# Patient Record
Sex: FEMALE | Race: WHITE | NOT HISPANIC OR LATINO | Employment: FULL TIME | URBAN - METROPOLITAN AREA
[De-identification: names, ages, dates, MRNs, and addresses within clinical notes are randomized per-mention and may not be internally consistent; named-entity substitution may affect disease eponyms.]

---

## 2017-03-30 ENCOUNTER — TELEPHONE (OUTPATIENT)
Dept: TRANSPLANT | Facility: CLINIC | Age: 47
End: 2017-03-30

## 2017-03-30 NOTE — TELEPHONE ENCOUNTER
Vesta Dickerson called and stated that she is interested in becoming a living donor for her friend, Khushboo Nicolle.  Medical and social history obtained.  No contraindications noted.  All questions answered.  HLA kit requested.

## 2017-04-06 ENCOUNTER — TELEPHONE (OUTPATIENT)
Dept: TRANSPLANT | Facility: CLINIC | Age: 47
End: 2017-04-06

## 2017-04-06 DIAGNOSIS — Z00.5 TRANSPLANT DONOR EVALUATION: Primary | ICD-10-CM

## 2017-04-11 PROCEDURE — 81376 HLA II TYPING 1 LOCUS LR: CPT | Mod: 91,PO,TXP

## 2017-04-11 PROCEDURE — 81373 HLA I TYPING 1 LOCUS LR: CPT | Mod: 91,PO,TXP

## 2017-04-11 PROCEDURE — 81376 HLA II TYPING 1 LOCUS LR: CPT | Mod: PO,TXP

## 2017-04-11 PROCEDURE — 81373 HLA I TYPING 1 LOCUS LR: CPT | Mod: PO,TXP

## 2017-04-12 ENCOUNTER — LAB VISIT (OUTPATIENT)
Dept: LAB | Facility: HOSPITAL | Age: 47
End: 2017-04-12
Payer: MEDICARE

## 2017-04-12 DIAGNOSIS — Z00.5 TRANSPLANT DONOR EVALUATION: ICD-10-CM

## 2017-04-12 PROCEDURE — 86900 BLOOD TYPING SEROLOGIC ABO: CPT

## 2017-04-12 PROCEDURE — 86901 BLOOD TYPING SEROLOGIC RH(D): CPT

## 2017-04-12 PROCEDURE — 36415 COLL VENOUS BLD VENIPUNCTURE: CPT

## 2017-04-12 PROCEDURE — 86850 RBC ANTIBODY SCREEN: CPT

## 2017-04-13 LAB
ABO GROUP BLD: NORMAL
BLD GP AB SCN CELLS X3 SERPL QL: NORMAL
RH BLD: NORMAL

## 2017-04-17 ENCOUNTER — TELEPHONE (OUTPATIENT)
Dept: TRANSPLANT | Facility: CLINIC | Age: 47
End: 2017-04-17

## 2017-04-17 DIAGNOSIS — Z00.5 TRANSPLANT DONOR EVALUATION: Primary | ICD-10-CM

## 2017-04-17 NOTE — TELEPHONE ENCOUNTER
Patient notified that the results of the crossmatch with LIDIA Gray show that they are compatible. States she would like to proceed with the evaluation. Required testing discussed and information to schedule appointments provided.   Patient will have recent pap smear and mammogram faxed to us.  Education material emailed to patient.  All questions were answered and patient verbalized understanding.

## 2017-04-19 LAB — HLATY INTERPRETATION: NORMAL

## 2017-05-01 LAB
HLA DRB4 1: NORMAL
HLA SSO DNA TYPING CLASS I & II INTERPRETATION: NORMAL
HLA-A 1 SERO. EQUIV: 24
HLA-A 1: NORMAL
HLA-A 2 SERO. EQUIV: 68
HLA-A 2: NORMAL
HLA-B 1 SERO. EQUIV: 7
HLA-B 1: NORMAL
HLA-B 2 SERO. EQUIV: 35
HLA-B 2: NORMAL
HLA-BW 1 SERO. EQUIV: 6
HLA-BW 2 SERO. EQUIV: NORMAL
HLA-C 1: NORMAL
HLA-C 2: NORMAL
HLA-CW 1 SERO. EQUIV: 4
HLA-CW 2 SERO. EQUIV: 7
HLA-DQ 1 SERO. EQUIV: 7
HLA-DQ 2 SERO. EQUIV: 6
HLA-DQB1 1: NORMAL
HLA-DQB1 2: NORMAL
HLA-DRB1 1 SERO. EQUIV: 4
HLA-DRB1 1: NORMAL
HLA-DRB1 2 SERO. EQUIV: 15
HLA-DRB1 2: NORMAL
HLA-DRB3 1: NORMAL
HLA-DRB3 2: NORMAL
HLA-DRB345 1 SERO. EQUIV: 53
HLA-DRB345 2 SERO. EQUIV: 51
HLA-DRB4 2: NORMAL
HLA-DRB5 1: NORMAL
HLA-DRB5 2: NORMAL
SSDQB TESTING DATE: NORMAL
SSDRB TESTING DATE: NORMAL
SSOA TESTING DATE: NORMAL
SSOB TESTING DATE: NORMAL
SSOC TESTING DATE: NORMAL
SSODR TESTING DATE: NORMAL
TYSSO TESTING DATE: NORMAL

## 2017-05-09 ENCOUNTER — TELEPHONE (OUTPATIENT)
Dept: RADIOLOGY | Facility: HOSPITAL | Age: 47
End: 2017-05-09

## 2017-05-10 ENCOUNTER — HOSPITAL ENCOUNTER (OUTPATIENT)
Dept: RADIOLOGY | Facility: HOSPITAL | Age: 47
Discharge: HOME | End: 2017-05-10
Attending: NURSE PRACTITIONER
Payer: MEDICARE

## 2017-05-10 ENCOUNTER — HOSPITAL ENCOUNTER (OUTPATIENT)
Dept: CARDIOLOGY | Facility: CLINIC | Age: 47
Discharge: HOME | End: 2017-05-10
Payer: MEDICARE

## 2017-05-10 DIAGNOSIS — Z00.5 TRANSPLANT DONOR EVALUATION: ICD-10-CM

## 2017-05-10 LAB
DIASTOLIC DYSFUNCTION: NO
MITRAL VALVE MOBILITY: NORMAL
RETIRED EF AND QEF - SEE NOTES: 55 (ref 55–65)

## 2017-05-10 PROCEDURE — 78707 K FLOW/FUNCT IMAGE W/O DRUG: CPT | Mod: 26,TXP,, | Performed by: RADIOLOGY

## 2017-05-10 PROCEDURE — 93351 STRESS TTE COMPLETE: CPT | Mod: PBBFAC,TXP | Performed by: INTERNAL MEDICINE

## 2017-05-10 PROCEDURE — 93321 DOPPLER ECHO F-UP/LMTD STD: CPT | Mod: 26,S$PBB,TXP, | Performed by: INTERNAL MEDICINE

## 2017-05-10 PROCEDURE — 71020 XR CHEST PA AND LATERAL: CPT | Mod: 26,TXP,, | Performed by: RADIOLOGY

## 2017-05-11 ENCOUNTER — OFFICE VISIT (OUTPATIENT)
Dept: TRANSPLANT | Facility: CLINIC | Age: 47
End: 2017-05-11
Payer: MEDICARE

## 2017-05-11 ENCOUNTER — DOCUMENTATION ONLY (OUTPATIENT)
Dept: TRANSPLANT | Facility: CLINIC | Age: 47
End: 2017-05-11

## 2017-05-11 ENCOUNTER — HOSPITAL ENCOUNTER (OUTPATIENT)
Dept: RADIOLOGY | Facility: HOSPITAL | Age: 47
Discharge: HOME | End: 2017-05-11
Attending: NURSE PRACTITIONER
Payer: MEDICARE

## 2017-05-11 ENCOUNTER — OFFICE VISIT (OUTPATIENT)
Dept: PSYCHIATRY | Facility: CLINIC | Age: 47
End: 2017-05-11
Payer: MEDICARE

## 2017-05-11 VITALS
DIASTOLIC BLOOD PRESSURE: 64 MMHG | BODY MASS INDEX: 25.24 KG/M2 | TEMPERATURE: 98 F | HEART RATE: 78 BPM | SYSTOLIC BLOOD PRESSURE: 128 MMHG | OXYGEN SATURATION: 97 % | WEIGHT: 142.44 LBS | HEIGHT: 63 IN | RESPIRATION RATE: 16 BRPM

## 2017-05-11 DIAGNOSIS — Z00.5 TRANSPLANT DONOR EVALUATION: ICD-10-CM

## 2017-05-11 DIAGNOSIS — Z01.818 PRE-OP EXAM: Primary | ICD-10-CM

## 2017-05-11 DIAGNOSIS — Z52.4 KIDNEY DONOR: ICD-10-CM

## 2017-05-11 DIAGNOSIS — N20.0 KIDNEY STONE: ICD-10-CM

## 2017-05-11 DIAGNOSIS — F41.9 ANXIETY: ICD-10-CM

## 2017-05-11 DIAGNOSIS — Z00.5 TRANSPLANT DONOR EVALUATION: Primary | ICD-10-CM

## 2017-05-11 PROCEDURE — 99999 PR PBB SHADOW E&M-EST. PATIENT-LVL III: CPT | Mod: PBBFAC,TXP,,

## 2017-05-11 PROCEDURE — 96102 PR PSYCHOLOGIC TESTING BY TECHNICIAN: CPT | Mod: 59,S$PBB,TXP, | Performed by: PSYCHOLOGIST

## 2017-05-11 PROCEDURE — 90791 PSYCH DIAGNOSTIC EVALUATION: CPT | Mod: S$PBB,TXP,, | Performed by: PSYCHOLOGIST

## 2017-05-11 PROCEDURE — 74174 CTA ABD&PLVS W/CONTRAST: CPT | Mod: 26,TXP,, | Performed by: RADIOLOGY

## 2017-05-11 PROCEDURE — 90791 PSYCH DIAGNOSTIC EVALUATION: CPT | Mod: PBBFAC,TXP | Performed by: PSYCHOLOGIST

## 2017-05-11 PROCEDURE — 96101 PR PSYCHOLOGIC TESTING BY PSYCH/PHYS: CPT | Mod: 59,PBBFAC,TXP | Performed by: PSYCHOLOGIST

## 2017-05-11 PROCEDURE — 96102 PR PSYCHOLOGIC TESTING BY TECHNICIAN: CPT | Mod: PBBFAC,TXP | Performed by: PSYCHOLOGIST

## 2017-05-11 PROCEDURE — 99205 OFFICE O/P NEW HI 60 MIN: CPT | Mod: S$PBB,TXP,, | Performed by: INTERNAL MEDICINE

## 2017-05-11 PROCEDURE — 96101 PR PSYCHOLOGIC TESTING BY PSYCH/PHYS: CPT | Mod: S$PBB,TXP,, | Performed by: PSYCHOLOGIST

## 2017-05-11 PROCEDURE — 25500020 PHARM REV CODE 255: Mod: TXP | Performed by: NURSE PRACTITIONER

## 2017-05-11 PROCEDURE — 99204 OFFICE O/P NEW MOD 45 MIN: CPT | Mod: S$PBB,TXP,, | Performed by: TRANSPLANT SURGERY

## 2017-05-11 PROCEDURE — 74174 CTA ABD&PLVS W/CONTRAST: CPT | Mod: TC,TXP

## 2017-05-11 RX ORDER — MULTIVIT WITH MINERALS/HERBS
1 TABLET ORAL DAILY
COMMUNITY

## 2017-05-11 RX ORDER — ESCITALOPRAM OXALATE 10 MG/1
10 TABLET ORAL NIGHTLY
COMMUNITY

## 2017-05-11 RX ADMIN — IOHEXOL 125 ML: 350 INJECTION, SOLUTION INTRAVENOUS at 04:05

## 2017-05-11 NOTE — PROGRESS NOTES
PHARM.D. PRE-TRANSPLANT DONOR NOTE:    This patient's medication therapy was evaluated as part of her pre-transplant evaluation.    The following pharmacologic concerns were noted: patient does report use of advil PMS before meunstral cycle- but will discontinue and switch to tylenol as needed after donation      Current Outpatient Prescriptions   Medication Sig Dispense Refill    b complex vitamins tablet Take 1 tablet by mouth once daily.      escitalopram oxalate (LEXAPRO) 10 MG tablet Take 10 mg by mouth once daily.       No current facility-administered medications for this visit.

## 2017-05-11 NOTE — NURSING
Labs and hx reviewed.  No nutrition intervention required at this time.      Mile Ibrahim, MS RD LDN

## 2017-05-11 NOTE — PROGRESS NOTES
"   Kidney Transplant Donor Evaluation    Subjective:       CC:  Initial evaluation of kidney donor candidacy.    HPI:  Ms. Adams is a 47 y.o. year old White female who has presented to be evaluated as a potential living unrelated donor for her best friend.  Ms. Adams reports being here without coercion, payment, guilt or other alternative motives other than wanting to help someone with kidney disease.    She is here with her boyfriend/care giver    Feels great. No chest pain SOB. Does not smoke. Drinks alcohol very rarely. Works full time.     Very motivated with donation to her best friend    Great appetite very active. Normal BMI    Not interested in future pregnancies. She had a definitive procedure for sterilization    Denied any diabetes or hypertension associated with prior pregnancies. No hypertension, pyelonephritis, proteinuria, hematuria in the past. No h/o birth defects compromising the urinary tract    1 episode of kidney stone in 1999 "associated with pregnancies". Her current PTH is slightly increased       Patient denies any history of coronary artery disease, stroke, seizure disorder, chronic obstructive pulmonary disease, liver disease, kidney stones, gallstones, deep venous thrombosis, pulmonary embolism, recurrent urinary tract infections or malignancies.    Review of Systems     Skin: no skin rash  CNS; no headaches, blurred vision, seizure, or syncope  ENT: No JVD,  Adenopathies,  nasal congestion. No oral lesions  Cardiac: No chest pain, dyspnea, claudication, edema or palpitations  Respiratory: No SOB, cough, hemoptysis   Gastro-intestinal: No diarrhea, constipation, abdominal pain, nausea, vomit. No ascitis  Genitourinary: no hematuria, dysuria, frequency, frequency  Musculoskeletal: joint pain, arthritis or vasculitic changes  Psych: alert awake, oriented, No cranial nerves deficit.      Objective:   Physical Exam     Head: normocephalic  Neck: No JVD, cervical axillary, or femoral " adenopathies  Heart: no murmurs, Normal s1 and s2, No gallops, no rubs, No murmurs  Lungs; CTA, good respiratory effort, no crackles  Abdomen: soft, non tender, no splenomegaly or hepatomegaly, no massess, no bruits  Extremities: No edema, skin rash, joint pain  SNC: awake, alert oriented. Cranial nerves are intact, no focalized, sensitivity and strength preserved      Labs:  5/10/2017: Creatinine 0.7 mg/dL (Ref range: 0.5 - 1.4 mg/dL); Creatinine, Random Ur 134.0 mg/dL (Ref range: 15.0 - 325.0 mg/dL); Urine, Creatinine 29.0 mg/dL (Ref range: 15.0 - 325.0 mg/dL); BUN, Bld 14 mg/dL (Ref range: 6 - 20 mg/dL)  5/10/2017: Nitrite, UA Negative (Ref range: Negative)  ABO type: O    Assessment:     1. Kidney stone in 1999    2. Potential kidney donor    Plan:   Donor Candidacy:   Based on the given information, Ms. Adams appears to be a suitable candidate for kidney donation.  A final recommendation will be made by the selection committee after reviewing her complete workup.    We spoke for 45 minutes    We reviewed current data and explained with details results including PTH alkaline phosphatase, CBC BMP imaging studies...    Education was provided    We spoke about risk of CKD, life expectancy for donors, calculators about life risk of ESRD     We also discussed inability to provide precise risk of CKD post donation    We spoke about need for follow up with PCP, BP monitoring , healthy life style.            Дмитрий Singleton MD       Counseling:   I discussed with Ms. Adams that donation is voluntary and reiterated it should be done willingly and for altruistic reasons only.  I reviewed that no payment should be received for donating.  I also discussed that coercion, guilt, pressure, or feelings of obligation are not appropriate reasons to donate.  The option to withdraw at any time was emphasized.  Ms. Adams was reminded that a medical opt out can be given to protect her confidentiality, and no member of the  transplant team will discuss specifics of her health or medical/social history with anyone else without permission.  The need for lifelong routine medical follow-up for optimal health, including routine health maintenance was reviewed.    We also discussed the long term risks associated with kidney donation.  I told the patient that her GFR should return to within 75-80% of pre-donation level within six months of donation.  I informed the patient that there is a small risk of developing albuminuria and hypertension following donor nephrectomy.  I also informed the patient that based on current literature, the risk of developing end-stage renal disease following donor nephrectomy is similar to the general population.    I reviewed with MsAlia Adams available lab results and other diagnostics from the evaluation process    Additional Counseling:   The patient was counseled on the need for regular follow-up with a primary care physician for blood pressure and cholesterol screening.  The importance of age appropriate health screening was also emphasized.    Follow-up:   As per our center protocol  24 urine collection for stone risk profile  Get a PCP and follow up 1 to 2 times a year  Follow up with Gyn as per current guidelines    Altogether, 45 minutes of this encounter were spent on counseling, which was greater than 50% of the total visit time.

## 2017-05-11 NOTE — PROGRESS NOTES
Transplant Surgery Kidney Donor Evaluation     Referring Physician:     Subjective:     Chief Complaint: Vesta Adams is a 47 y.o. year old female who presents today wishing to be evaluated as a potential living unrelated donor for a friend.    History of Present Illness:  Vesta reports being here without coercion, payment, guilt, or other alternative motives other than wanting to help someone with kidney disease.    Review of Systems   Constitutional: Negative for chills and fever.   HENT: Negative for facial swelling and sore throat.    Eyes: Negative for redness and itching.   Respiratory: Negative for cough and shortness of breath.    Cardiovascular: Negative for chest pain and leg swelling.   Gastrointestinal: Negative for abdominal distention and abdominal pain.   Endocrine: Negative for polydipsia and polyphagia.   Genitourinary: Negative for dysuria and hematuria.   Musculoskeletal: Negative for back pain and myalgias.   Skin: Negative for pallor and rash.   Allergic/Immunologic: Negative for environmental allergies and immunocompromised state.   Neurological: Negative for light-headedness and headaches.   Hematological: Negative for adenopathy. Does not bruise/bleed easily.   Psychiatric/Behavioral: Negative for agitation and confusion.       Objective:   Physical Exam   Constitutional: She is oriented to person, place, and time. She appears well-developed and well-nourished. No distress.   Neck: Normal range of motion. Neck supple. No JVD present.   Cardiovascular: Normal rate and intact distal pulses.    Pulmonary/Chest: Effort normal. No respiratory distress.   Abdominal: Soft. She exhibits no distension and no mass. There is no tenderness. There is no rebound and no guarding.       Musculoskeletal: She exhibits no edema.   Neurological: She is alert and oriented to person, place, and time.   Skin: Skin is warm and dry. She is not diaphoretic.   Psychiatric: She has a normal mood and affect.      ABO type: O    Diagnoses:  1. Kidney stone in 1999             Transplant Surgery - Candidacy   Assessment/Plan:     Donor Candidacy: Based on information available thus far, Vesta is a suitable candidate for living kidney donation.    Vikash Segura MD       Education: I discussed with the patient the requirements for donation including the compatibility of blood and tissue typing, healthy by physical examination and workup, as well as the desire to donate.  We discussed the risks related to surgery including the risks related to anesthesia, bleeding, infection, inability for surgery to be performed laparoscopically, risks of reoperation as well as the risks of death.  We discussed the length of hospitalization, return to work times, as well as follow-up post-donation.    I also discussed the slight possibility that due to problems with the recipient operation, the transplant might not be able to be completed after the organ was already removed. If such a situation should arise, the donor prefers that the organ be transplanted into a suitable third-party recipient.    I discussed the possibility that living donor sometimes encounter problems obtaining health insurance, or could have higher premium despite ongoing efforts of transplant professionals to educate insurance companies on this issue.    I discussed with Vesta that donation is a voluntary activity, and reiterated it should be done willingly and for altruistic reasons only.  I reviewed that no payment should be made for donating.  I also discussed that coersion, guilt, pressure, or feelings of obligation are not appropriate reasons to donate.  The option to withdraw at any time was emphasized.  Vesta was reminded that a medical opt out can be given to protect her confidentiality, and no member of the transplant team will discuss specifics of her health or medical/social history with anyone else without permission.  The need for lifelong routine  medical follow-up for optimal health, including routine health maintenance was reviewed.    Additionally, I discussed the need for our program to be able to contact living donors for UNOS reporting purposes for a minimum of 2 years.  Failure of our center to be able to provide such information could jeopardize our ability to continue to offer living donor transplants.  Vesta voices understanding and agrees to this follow-up.    I discussed the UNOS requirement for centers to report donor status for a minimum of two years. Vesta understands that failure to comply with requirement could have adverse consequences for our transplant program, and agrees to cooperate with all our required follow-up.    I reviewed with Vesta available lab results and other diagnostics from the evaluation process.

## 2017-05-11 NOTE — PROGRESS NOTES
"DONOR TEACHING NOTE    Met with Vesta Adams today in clinic to review living donor education.        Topics covered included:  · Kidney donation is done voluntarily and of the donor's free will.  Process can stop at any time.   · Better success rates than cadaveric donation, shorter waiting time for the recipient: less than the 3-5 year wait on the list, more time to prepare: tests/surgery can be planned  · Laboratory studies of blood and urine.  Health exams: records of GYN/pap/mammogram (females); evaluation by transplant team and a psychiatrist (if indicated); diagnostic Tests: CXR, EKG, TB skin test, 24-hour urine collection, renal scan, CT of abdomen to assess kidney anatomy, and stress test (if indicated)  · Team will review workup for approval.  Copy of the approved criteria for donation given to patient.  Surgeon will decide which kidney will be donated.   · Benefits of laparoscopic nephrectomy: less pain, shorter hospital stay, a shorter recovery period.  Risks discussed: bleeding, deep vein thrombosis, pulmonary embolus, the need for re-operation.  Your operation may be converted to an "open" procedure if the surgeon feels it is medically necessary.  · To recovery room, transfer to TSU, if space allows or semi-private room.  Hart catheter/IV, average hospital stay is 1 day.  At discharge the cost of any prescriptions is the donor's responsibility.  · Post-operative visit 4 weeks after surgery or prior to returning to work, unless a complication arises and you need to be seen sooner.  Off of work for about 3 to 6 weeks, no driving for at least the first 3 weeks.  General surgical complications: infection, allergic reaction, and anesthesia.   · Long life considerations of living with one kidney: risk of HTN and kidney failure   · Following up with PCP 6 months after donation then yearly thereafter to monitor kidney function.  This should include weight, labs, urinalysis, and a blood pressure check.  " We are required to report your progress to UNOS at 6 months, 1 year, and 2 years post-donation.     Written educational material provided. Informed consent reviewed and signed. All questions were answered and patient verbalized understanding.     Patient is a suitable candidate for living kidney donation.

## 2017-05-15 ENCOUNTER — TELEPHONE (OUTPATIENT)
Dept: PSYCHIATRY | Facility: CLINIC | Age: 47
End: 2017-05-15
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
## 2017-05-15 NOTE — TELEPHONE ENCOUNTER
Report of Psychological Evaluation is completed. It can be accessed through the Chart Review activity under the Notes tab (11th tab to the right of the Encounters tab).  It is titled Psych Testing.      Mrs. Adams has a history of mild anxiety which has been treated on an outpatient basis with good results. She denied current anxiety/depression and appeared to be in good spirits in interview. Test data did not suggest significant anxiety, depression, substance abuse, or thought disorder. This evaluation revealed no contraindications to kidney donation from the psychological perspective. Her Ob/Gyn will continue to prescribe Lexapro until her new PCP is in place.    Thanks. EC
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
## 2017-05-19 ENCOUNTER — LAB VISIT (OUTPATIENT)
Dept: LAB | Facility: HOSPITAL | Age: 47
End: 2017-05-19
Attending: INTERNAL MEDICINE
Payer: COMMERCIAL

## 2017-05-19 DIAGNOSIS — Z00.5 TRANSPLANT DONOR EVALUATION: ICD-10-CM

## 2017-05-19 DIAGNOSIS — Z00.5 TRANSPLANT DONOR EVALUATION: Primary | ICD-10-CM

## 2017-05-19 PROCEDURE — 83945 ASSAY OF OXALATE: CPT | Mod: TXP

## 2017-05-19 PROCEDURE — 83735 ASSAY OF MAGNESIUM: CPT | Mod: TXP

## 2017-05-24 LAB — STONE ANALYSIS-IMP: NORMAL

## 2017-05-26 ENCOUNTER — COMMITTEE REVIEW (OUTPATIENT)
Dept: TRANSPLANT | Facility: CLINIC | Age: 47
End: 2017-05-26

## 2017-05-26 DIAGNOSIS — Z00.5 TRANSPLANT DONOR EVALUATION: Primary | ICD-10-CM

## 2017-05-26 NOTE — COMMITTEE REVIEW
Vesta Mooreerson was presented at selection committee on 5/26/17 . Patient did meet selection criteria for living kidney donation.  No absolute contraindications noted. Will repeat IPTH and obtain an endocrine consult to evaluate elevated IPTH.     Patient notified of committee decision. Will obtain consult asap, as she would like to proceed with surgery on 7/17/17. All questions were answered and patient verbalized understanding.     Note written by Traci Gomez RN.    ================================================================    I was present at the meeting and attest to the decision of the committee.

## 2017-06-02 ENCOUNTER — TELEPHONE (OUTPATIENT)
Dept: TRANSPLANT | Facility: CLINIC | Age: 47
End: 2017-06-02

## 2017-06-02 NOTE — TELEPHONE ENCOUNTER
Repeat lab testing reviewed with Dr. Rios. Repeat IPTH normal. Does not need endocrine consult. Patient notified.  Would like to proceed with surgery 7/17/17. Provided contact information for Edith Castillo RN. All questions were answered and patient verbalized understanding.

## 2017-06-07 ENCOUNTER — TELEPHONE (OUTPATIENT)
Dept: TRANSPLANT | Facility: CLINIC | Age: 47
End: 2017-06-07

## 2017-06-07 DIAGNOSIS — Z00.5 TRANSPLANT DONOR EVALUATION: Primary | ICD-10-CM

## 2017-06-07 NOTE — TELEPHONE ENCOUNTER
Confirmed surgery date of 7/10/17 and will pre-op on 7/6/17.  Denies any blood thinners or hormones.  Discussed possibility of cancellation due to a busy service.  Updated Mammo and PAP are scheduled for 6/14/17 and results will be faxed over when completed.  All questions answered and patient verbalized understanding.

## 2017-06-08 DIAGNOSIS — Z00.5 TRANSPLANT DONOR EVALUATION: Primary | ICD-10-CM

## 2017-06-15 ENCOUNTER — TELEPHONE (OUTPATIENT)
Dept: TRANSPLANT | Facility: CLINIC | Age: 47
End: 2017-06-15

## 2017-06-15 NOTE — TELEPHONE ENCOUNTER
----- Message from Jose Brown MD sent at 6/15/2017  1:34 PM CDT -----  Left   ----- Message -----  From: Traci Gomez  Sent: 5/22/2017  11:41 AM  To: Jose Brown MD    Please review donor CT scan. Left or right?

## 2017-07-06 ENCOUNTER — OFFICE VISIT (OUTPATIENT)
Dept: TRANSPLANT | Facility: CLINIC | Age: 47
End: 2017-07-06
Payer: MEDICARE

## 2017-07-06 ENCOUNTER — CLINICAL SUPPORT (OUTPATIENT)
Dept: TRANSPLANT | Facility: CLINIC | Age: 47
End: 2017-07-06
Payer: MEDICARE

## 2017-07-06 ENCOUNTER — SOCIAL WORK (OUTPATIENT)
Dept: TRANSPLANT | Facility: CLINIC | Age: 47
End: 2017-07-06
Payer: MEDICARE

## 2017-07-06 ENCOUNTER — HOSPITAL ENCOUNTER (OUTPATIENT)
Dept: PREADMISSION TESTING | Facility: HOSPITAL | Age: 47
Discharge: HOME | End: 2017-07-06
Attending: ANESTHESIOLOGY
Payer: MEDICARE

## 2017-07-06 ENCOUNTER — ANESTHESIA EVENT (OUTPATIENT)
Dept: SURGERY | Facility: HOSPITAL | Age: 47
DRG: 661 | End: 2017-07-06
Payer: MEDICARE

## 2017-07-06 VITALS
DIASTOLIC BLOOD PRESSURE: 64 MMHG | HEIGHT: 64 IN | WEIGHT: 138.44 LBS | OXYGEN SATURATION: 98 % | BODY MASS INDEX: 23.64 KG/M2 | SYSTOLIC BLOOD PRESSURE: 111 MMHG | TEMPERATURE: 98 F | HEART RATE: 80 BPM | HEART RATE: 80 BPM | OXYGEN SATURATION: 98 % | DIASTOLIC BLOOD PRESSURE: 64 MMHG | OXYGEN SATURATION: 98 % | TEMPERATURE: 98 F | HEART RATE: 80 BPM | OXYGEN SATURATION: 98 % | DIASTOLIC BLOOD PRESSURE: 64 MMHG | HEIGHT: 64 IN | WEIGHT: 138.44 LBS | HEIGHT: 64 IN | TEMPERATURE: 98 F | DIASTOLIC BLOOD PRESSURE: 64 MMHG | WEIGHT: 138.44 LBS | SYSTOLIC BLOOD PRESSURE: 111 MMHG | HEIGHT: 64 IN | BODY MASS INDEX: 23.64 KG/M2 | SYSTOLIC BLOOD PRESSURE: 111 MMHG | BODY MASS INDEX: 23.64 KG/M2 | WEIGHT: 138.44 LBS | TEMPERATURE: 98 F | SYSTOLIC BLOOD PRESSURE: 111 MMHG | HEART RATE: 80 BPM | BODY MASS INDEX: 23.64 KG/M2

## 2017-07-06 VITALS
DIASTOLIC BLOOD PRESSURE: 62 MMHG | TEMPERATURE: 99 F | OXYGEN SATURATION: 99 % | WEIGHT: 140.44 LBS | HEIGHT: 64 IN | SYSTOLIC BLOOD PRESSURE: 118 MMHG | BODY MASS INDEX: 23.98 KG/M2 | RESPIRATION RATE: 18 BRPM | HEART RATE: 82 BPM

## 2017-07-06 DIAGNOSIS — Z01.818 PREOP EXAMINATION: Primary | ICD-10-CM

## 2017-07-06 DIAGNOSIS — Z52.4 DONOR OF KIDNEY FOR TRANSPLANT: ICD-10-CM

## 2017-07-06 DIAGNOSIS — N20.0 KIDNEY STONE: ICD-10-CM

## 2017-07-06 PROCEDURE — 99213 OFFICE O/P EST LOW 20 MIN: CPT | Mod: PBBFAC,25,27

## 2017-07-06 PROCEDURE — 99215 OFFICE O/P EST HI 40 MIN: CPT | Mod: S$PBB,,, | Performed by: TRANSPLANT SURGERY

## 2017-07-06 PROCEDURE — 99999 PR PBB SHADOW E&M-EST. PATIENT-LVL III: CPT | Mod: PBBFAC,,,

## 2017-07-06 PROCEDURE — 99999 PR PBB SHADOW E&M-EST. PATIENT-LVL III: CPT | Mod: PBBFAC,TXP,, | Performed by: INTERNAL MEDICINE

## 2017-07-06 PROCEDURE — 99999 PR PBB SHADOW E&M-EST. PATIENT-LVL III: CPT | Mod: PBBFAC,TXP,,

## 2017-07-06 PROCEDURE — 99214 OFFICE O/P EST MOD 30 MIN: CPT | Mod: S$PBB,,, | Performed by: INTERNAL MEDICINE

## 2017-07-06 RX ORDER — HEPARIN SODIUM 5000 [USP'U]/ML
5000 INJECTION, SOLUTION INTRAVENOUS; SUBCUTANEOUS
Status: CANCELLED | OUTPATIENT
Start: 2017-07-06

## 2017-07-06 NOTE — PROGRESS NOTES
PHARM.D. PRE-TRANSPLANT DONOR NOTE:    This patient's medication therapy was evaluated as part of her pre-transplant evaluation.    The following pharmacologic concerns were noted: none      Current Outpatient Prescriptions   Medication Sig Dispense Refill    b complex vitamins tablet Take 1 tablet by mouth once daily.      escitalopram oxalate (LEXAPRO) 10 MG tablet Take 10 mg by mouth once daily.       No current facility-administered medications for this visit.          Currently she is responsible for preparing / administering this patient's medications on a daily basis.  I am available for consultation and can be contacted, as needed by the other members of the Kidney Transplant team.

## 2017-07-06 NOTE — PROGRESS NOTES
PRE-OP TEACHING NOTE    Vesta Adams is here today for pre-op appointments.  Pre-op instructions reviewed and written information was provided.  All questions were answered.  Discussed possibility that surgery may be rescheduled if the program is busy with  donor transplants.  Patient agreed and verbalized understanding of all instructions.

## 2017-07-06 NOTE — PROGRESS NOTES
DONOR PRE-OP NOTE    Potential Donor Name: Vesta Adams, [id number]  Encounter Date: 7/6/2017    Sex: female  YOB: 1970  Age: 47 y.o.    Housing/Contact Info:  [id number]    Telephone Information:   Mobile [phone]    Home: [phone] (home)  Work: There is no work phone number on file.  E-mail: [email or web address]    Potential Surgery Date: Monday  Potential Recipient Name: Lizet Gray, Clinic Number: [id number]  Potential Recipient Relationship: friend    Patient presents as alert and oriented x 4, pleasant, good eye contact, well groomed, recall good, concentration/judgement good, average intelligence, calm, communicative, cooperative and asking and answering questions appropriately. Patient presents as a 47 y.o. year old female to donor pre-op appointment for scheduled kidney living donor surgery. Patient's significant other accompanies patient.  Patient states that she is independent with ADLs at this time.  Patient states continued motivation to pursue organ donation at this time.    Does patient drive?  Patient is aware will not be able to drive until medically cleared by the transplant team. Patient verbalizes understanding that patient will need assistance for all transportation needs until medically cleared to drive.    Caregivers/Transportation:  Name: Raul Sainz  Age: 48  Phone: [phone]  Relationship: significant other  Does person drive? yes  Does person have own/reliable transportation? yes    Cognitive:  Education: college degree  Reading Level: college  Reports difficulty with: seeing and utilizes glasses  Denies difficulty with: reading, writing, hearing, comprehension, learning and memory    Infusion Service: patient utilizing? no  Home Health: patient utilizing? no  DME:  patient utilizing? no    Living Will: no  Healthcare Power of : no  Written LW/HCPA and verbal information presented to patient today.    Insurance: See  potential recipient's insurance for donor coverage.    Patient's Insurance: Does potential donor have their own health insurance? Yes  Possible concerns regarding insurance post-donation reviewed. Patient verbalizes clear understanding.    Financial:  Employment: Patient is currently employed. Patient does plan to return to work once medically cleared.   Spouse/Significant Other Employment: employed  Patient states does not expect to have any financial problems following transplant surgery.  Patient states has not conducted fundraising to assist with donation costs.    Tobacco/Alcohol/Illicit Drug Abuse: Patient reports does not use tobacco products, alcohol, illicit drugs and non-prescription medications and that patient does plan to remain abstinent.     Psychiatric History: yes, pt previously reports anxiety due to previous marraige and pending divorce. Pt's s/o reports anxiety with pending surgery. Pt's s/o reports pt is concerned about her children is she dies as a result of donation and if the surgery is rescheduled multiple time. Pt was not going to report this information to SW initially and seemed relieved after s/o addressed it. SW provided acknowledgement, validation, active listening, normalization and support. Pt reports feeling comforted and denies lack of motivation to donate. SW encouarged pt to continue processing her feelings regarding donation and to remain open with team. Pt and s/o verbalized understanding.  Pt also utilizes [employer].     Coping: Patient states that she is coping well at this time and states does not have anymore concerns, questions, or needs at this time. Patient states will call as needed and does understand how to access resources including the  as needed, both inpatient and outpatient.    Resources, information and support provided. Psychosocial aspects regarding organ donation and transplantation were discussed. Patient reports having a clear understanding of  resources, information, support and psychosocial aspects related to organ donation.    Discharge Plan: Patient to discharge to the [facility] under the care of patient's significant other post-organ transplant. Patient states that patient's significant other will be present as caregiver in the hospital. Patient's significant other will transport patient home. Patient states agreement with not driving and not returning to work until medically cleared to do so.    Patient continues to report having a clear understanding of confidentiality, option to not donate, alternative treatment options, importance of compliance, resources and resource limitations, insurance and insurance insurable limitations, educational information, and the risks involved. Patient understands that the transplant may or may not work, the transplant date/donation date may be cancelled or postponed, and the psychosocial factors involved before, during and after donation.    Patient states having clear understanding and realistic expectations regarding the potential risks and potential benefits of organ transplantation and organ donation. Patient agrees to further discuss with health care team members and support system members, as well as to utilize available resources and express questions and/or concerns. Resources and information provided and reviewed.    Patient denies feelings of coercion, pressure or obligation to donate. Patient states that she is not receiving compensation for organ donation.    Patient reports motivation to pursue organ donation at this time.    Suitability for Donation: At this time, patient presents as a a suitable candidate for organ donation. Patient states does have a caregiver plan, transportation plan, and lodging plan in place. Patient states that patient does have medical and prescription medicine insurance in place and does have a plan in place to afford post-transplant costs.    Patient provided verbal  permission to release any necessary information to outside resources for patient care and discharge planning.  Resources and information provided and reviewed.  Patient is choosing local pharmacy.    Pharmacy Name: [facility]   Pharmacy Contact Information: Pt is aware of the [facility] on [facility]      provided psychosocial support, counseling, resources, education, assistance, and discharge planning.  remains available.    Recommendations/Additional Comments: Pt does struggled with anxiety and utilizes Lexapro.     Patient states is aware of [facility]'s affiliation and/or partnership with agencies in home health care, LTAC, SNF, [facility], and other hospitals and clinics.

## 2017-07-06 NOTE — LETTER
July 6, 2017                     [address]  [address]  [facility] [address]  Phone: [phone]   Patient: Vesta Adams   MR Number: [id number]   YOB: 1970   Date of Visit: 7/6/2017       Dear      Thank you for referring Vesta Adams to me for evaluation. Attached you will find relevant portions of my assessment and plan of care.    If you have questions, please do not hesitate to call me. I look forward to following Vesta Adams along with you.    Sincerely,    Alexia Rios MD    Enclosure    If you would like to receive this communication electronically, please contact [email or web address] or [phone] to request [employer] access.    [employer] is a tool which provides read-only access to select patient information with whom you have a relationship. Its easy to use and provides real time access to review your patients record including encounter summaries, notes, results, and demographic information.    If you feel you have received this communication in error or would no longer like to receive these types of communications, please e-mail [email or web address]

## 2017-07-06 NOTE — PROGRESS NOTES
Kidney Donor Preoperative Evaluation    Subjective:     CC:  Preoperative evaluation before donor nephrectomy.    HPI:  Ms. Adams is a 47 y.o. year old White female who has been approved to be a living unrelated donor for her friend.  She denies any changes in her health condition since her previous visit.  Patient denies any history of DM, HTN,  recent coronary artery disease, stroke, seizure disorder, chronic obstructive pulmonary disease, liver disease, kidney stones, gallstones, deep venous thrombosis, pulmonary embolism, recurrent urinary tract infections or malignancies.Her repeated PTH was normal (per patient). All her work ups have been normal.     Review of Systems     Skin: no skin rash  CNS; no headaches, blurred vision, seizure, or syncope  ENT: No JVD,  Adenopathies,  nasal congestion. No oral lesions  Cardiac: No chest pain, dyspnea, claudication, edema or palpitations  Respiratory: No SOB, cough, hemoptysis   Gastro-intestinal: No diarrhea, constipation, abdominal pain, nausea, vomit. No ascitis  Genitourinary: no hematuria, dysuria, frequency, frequency  Musculoskeletal: joint pain, arthritis or vasculitic changes  Psych: alert awake, oriented, No cranial nerves deficit.    Objective:   body mass index is 23.76 kg/m².  Physical Exam     Constitutional: She is oriented to person, place, and time. She appears well-developed and well-nourished.   HENT:   Head: Normocephalic and atraumatic.   Eyes: EOM are normal. Pupils are equal, round, and reactive to light.   Cardiovascular: Normal rate and regular rhythm.    Pulmonary/Chest: Effort normal.   Abdominal: Soft. She exhibits no distension. There is no tenderness. There is no guarding.   Musculoskeletal: Normal range of motion.   Neurological: She is alert and oriented to person, place, and time.   Skin: Skin is warm and dry.   Psychiatric: She has a normal mood and affect. Her behavior is normal.      Labs:  5/10/2017: WBC, UA 1 /hpf (Ref range:  0 - 5 /hpf)  5/10/2017: Creatinine, Random Ur 134.0 mg/dL (Ref range: 15.0 - 325.0 mg/dL); Urine, Creatinine 29.0 mg/dL (Ref range: 15.0 - 325.0 mg/dL)  7/6/2017: Creatinine 0.7 mg/dL (Ref range: 0.5 - 1.4 mg/dL); BUN, Bld 12 mg/dL (Ref range: 6 - 20 mg/dL)  5/10/2017: Nitrite, UA Negative (Ref range: Negative)    Labs were reviewed with the patient.    ABO type: O POS    Assessment:     1. Preop examination    2. Donor of kidney for transplant    3. Kidney stone in 1999        Plan:   Donor Candidacy:   Ms. Adams remains an excellent candidate for kidney donation.    Alexia Rios MD         Counseling:   I discussed with Ms. Adams that donation is a voluntary activity and reiterated it should be done willingly and for altruistic reasons only.  I reviewed that no payment should be received for donating.  I also discussed that coercion, guilt, pressure, or feelings of obligation are not appropriate reasons to donate.  The option to withdraw at any time was emphasized.  Ms. Adams was reminded that a medical opt out can be given to protect her confidentiality, and no member of the transplant team will discuss specifics of her health or medical/social history with anyone else without permission.  The need for lifelong routine medical follow-up for optimal health, including routine health maintenance was reviewed.    We also discussed the long term risks associated with kidney donation.  I told the patient that her GFR should return to within 75-80% of pre-donation level within six months of donation.  I informed the patient that there is a small risk of developing albuminuria and hypertension following donor nephrectomy.  I also informed the patient that based on current literature, the risk of developing end-stage renal disease following donor nephrectomy is similar to the general population.    I rereviewed with Ms. Adams available lab results and other diagnostics from the evaluation  process    Additional Counseling:   The patient was counseled on the need for regular follow-up with a primary care physician for blood pressure and cholesterol screening.  The importance of age appropriate health screening was also emphasized.    Follow-up:  Follow-up with transplant surgery per protocol.

## 2017-07-06 NOTE — ANESTHESIA PREPROCEDURE EVALUATION
07/06/2017  Vesta Adams is a 47 y.o., female.    Anesthesia Evaluation         Review of Systems  Anesthesia Hx:  History of prior surgery of interest to airway management or planning: Previous anesthesia: General lap appendectomy 12 yrs ago; uterine ablation 10 yrs ago with general anesthesia. Denies Family Hx of Anesthesia complications.  Personal Hx of Anesthesia complications, Post-Operative Nausea/Vomiting, with every anesthetic, treatment not known   Social:  Former Smoker, Social Alcohol Use Quit 2 yrs ago; 1 ppd x 25 yrs   Hematology/Oncology:     Oncology Normal    -- Denies Anemia:   EENT/Dental:   Glasses  TMJ   Cardiovascular:   Denies Hypertension.   Functional Capacity good / => 4 METS, walking for 45 min 3-4 x weekly; denies CP, SOB    Pulmonary:   Denies Asthma.  Denies Sleep Apnea.    Renal/:   renal calculi (1999)    Hepatic/GI:   GERD (takes prn Zantac; diet changes; lost 13 lbs; seldom has symptoms)    Musculoskeletal:  Musculoskeletal Normal    Neurological:   Denies CVA. Denies Seizures.  Denies Pain    Endocrine:   Denies Diabetes.    Psych:  Phobia and Claustrophobia.         Physical Exam  General:  Well nourished    Airway/Jaw/Neck:  Airway Findings: Mouth Opening: Normal Tongue: Normal  General Airway Assessment: Adult  Mallampati: II  Jaw/Neck Findings:     Neck ROM: Normal ROM      Dental:  Dental Findings: In tact   Chest/Lungs:  Chest/Lungs Findings: Clear to auscultation, Normal Respiratory Rate     Heart/Vascular:  Heart Findings: Rate: Normal  Rhythm: Regular Rhythm  Sounds: Normal        Mental Status:  Mental Status Findings:  Cooperative, Alert and Oriented       Pt was seen in POC 7/6/17/Evelin Ambriz RN        Anesthesia Plan  Type of Anesthesia, risks & benefits discussed:  Anesthesia Type:  general  Patient's Preference: General  Intra-op Monitoring  Plan: standard ASA monitors  Intra-op Monitoring Plan Comments:   Post Op Pain Control Plan: per primary service following discharge from PACU  Post Op Pain Control Plan Comments: Per primary service  Induction:   IV  Beta Blocker:  Patient is not currently on a Beta-Blocker (No further documentation required).       Informed Consent: Patient understands risks and agrees with Anesthesia plan.  Questions answered. Anesthesia consent signed with patient.  ASA Score: 2     Day of Surgery Review of History & Physical:    H&P update referred to the surgeon.     Anesthesia Plan Notes: Scop. Patch, decadron, zofran, phenergan (prn)        Ready For Surgery From Anesthesia Perspective.

## 2017-07-06 NOTE — DISCHARGE INSTRUCTIONS
Your surgery has been scheduled for:__________________________________________    You should report to:  ____[facility], located on the [place] side of the first floor of the           [facility] ([phone])  ____[facility], located on the [facility] side of the            Second floor of the [facility] ([phone])  ____3rd Floor [facility] located on the [facility] side of the [facility] [phone]  Please Note   - Tell your doctor if you take Aspirin, products containing Aspirin, herbal medications  or blood thinners, such as Coumadin, Ticlid, or Plavix.  (Consult your provider regarding holding or stopping before surgery).  - Arrange for someone to drive you home following surgery.  You will not be allowed to leave the surgical facility alone or drive yourself home following sedation and anesthesia.  Before Surgery  - Stop taking all herbal medications 14days prior to surgery  - No Motrin/Advil (Ibuprofen) 7 days before surgery  - No Aleve (Naproxen) 7 days before surgery  - Stop Taking Asprin, products containing Asprin _____days before surgery  - Stop taking blood thinners_______days before surgery  - Refrain from drinking alcoholic beverages for 24hours before and after surgery  - Stop or limit smoking _________days before surgery  Night before Surgery  - DO NOT EAT OR DRINK ANYTHING AFTER MIDNIGHT, INCLUDING GUM, HARD CANDY, MINTS, OR CHEWING TOBACCO.  - Take a shower or bath (shower is recommended).  Bathe with Hibiclens soap or an antibacterial soap from the neck down.  If not supplied by your surgeon, hibiclens soap will need to be purchased over the counter in pharmacy.  Rinse soap off thoroughly.  - Shampoo your hair with your regular shampoo  The Day of Surgery  - Take another bath or shower with hibiclens or any antibacterial soap, to reduce the chance of infection.  - Take heart and blood  pressure medications with a small sip of water, as advised by the perioperative team.  - Do not take fluid pills  - You may brush your teeth and rinse your mouth, but do not swall any additional water.   - Do not apply perfumes, powder, body lotions or deodorant on the day of surgery.  - Nail polish should be removed.  - Do not wear makeup or moisturizer  - Wear comfortable clothes, such as a button front shirt and loose fitting pants.  - Leave all jewelry, including body piercings, and valuables at home.    - Bring any devices you will neeed after surgery such as crutches or canes.  - If you have sleep apnea, please bring your CPAP machine  In the event that your physical condition changes including the onset of a cold or respiratory illness, or if you have to delay or cancel your surgery, please notify your surgeon.Anesthesia: General Anesthesia  Youre due to have surgery. During surgery, youll be given medication called anesthesia. (It is also called anesthetic.) This will keep you comfortable and pain-free. Your anesthesia provider will use general anesthesia. This sheet tells you more about it.  What is general anesthesia?     You are watched continuously during your procedure by the anesthesia provider   General anesthesia puts you into a state like deep sleep. It goes into the bloodstream (IV anesthetics), into the lungs (gas anesthetics), or both. You feel nothing during the procedure. You will not remember it. During the procedure, the anesthesia provider monitors you continuously. He or she checks your heart rate and rhythm, blood pressure, breathing, and blood oxygen.  · IV Anesthetics. IV anesthetics are given through an IV line in your arm. Theyre often given first. This is so you are asleep before a gas anesthetic is started. Some kinds of IV anesthetics relieve pain. Others relax you. Your doctor will decide which kind is best in your case.  · Gas Anesthetics. Gas anesthetics are breathed into the  lungs. They are often used to keep you asleep. They can be given through a facemask or a tube placed in your larynx or trachea (breathing tube).  ? If you have a facemask, your anesthesia provider will most likely place it over your nose and mouth while youre still awake. Youll breathe oxygen through the mask as your IV anesthetic is started. Gas anesthetic may be added through the mask.  ? If you have a tube in the larynx or trachea, it will be inserted into your throat after youre asleep.  Anesthesia tools and medications  You will likely have:  · IV anesthetics. These are put into an IV line into your bloodstream.  · Gas anesthetics. You breathe these anesthetics into your lungs, where they pass into your bloodstream.  · Pulse oximeter. This is a small clip that is attached to the end of your finger. This measures your blood oxygen level.  · Electrocardiography leads (electrodes). These are small sticky pads that are placed on your chest. They record your heart rate and rhythm.  · Blood pressure cuff. This reads your blood pressure.  Risks and possible complications  General anesthesia has some risks. These include:  · Breathing problems  · Nausea and vomiting  · Sore throat or hoarseness (usually temporary)  · Allergic reaction to the anesthetic  · Irregular heartbeat (rare)  · Cardiac arrest (rare)   Anesthesia safety  · Follow all instructions you are given for how long not to eat or drink before your procedure.  · Be sure your doctor knows what medications and drugs you take. This includes over-the-counter medications, herbs, supplements, alcohol or other drugs. You will be asked when those were last taken.  · Have an adult family member or friend drive you home after the procedure.  · For the first 24 hours after your surgery:  ? Do not drive or use heavy equipment.  ? Have a trusted family member or spouse make important decisions or sign documents.  ? Avoid alcohol.  ? Have a responsible adult stay with  you. He or she can watch for problems and help keep you safe.  Date Last Reviewed: 10/16/2014  © 4133-2588 [employer]. [address] [place] [address], [place] [address]. All rights reserved. This information is not intended as a substitute for professional medical care. Always follow your healthcare professional's instructions.

## 2017-07-06 NOTE — PROGRESS NOTES
"   Transplant Surgery Kidney Donor Preoperative Evaluation / History and PHysical    Subjective:   CC:  Preoperative evaluation before donor nephrectomy.    HPI:  Ms. Adams is a 47 y.o. year old White female who has been approved to be a living unrelated donor for her friend Isauro Valverde.  She denies any changes in her health condition since her previous visit.      Review of Systems   Constitutional: Negative for activity change, appetite change, chills and fever.   HENT: Negative for congestion, nosebleeds, sinus pressure, sore throat and voice change.    Eyes: Negative for pain and visual disturbance.   Respiratory: Negative for cough and shortness of breath.    Cardiovascular: Negative for palpitations and leg swelling.   Gastrointestinal: Negative for abdominal distention, abdominal pain, diarrhea, nausea and vomiting.   Endocrine: Negative for cold intolerance and heat intolerance.   Genitourinary: Negative for dysuria, flank pain, frequency and hematuria.   Musculoskeletal: Negative for back pain and gait problem.   Skin: Negative for color change, pallor and wound.   Allergic/Immunologic: Negative for food allergies.   Neurological: Negative for dizziness, seizures, numbness and headaches.   Hematological: Negative for adenopathy.   Psychiatric/Behavioral: Negative for agitation, behavioral problems, hallucinations and sleep disturbance.       Objective:   Blood pressure 111/64, pulse 80, temperature 98.2 °F (36.8 °C), temperature source Oral, height 5' 4" (1.626 m), weight 62.8 kg (138 lb 7.2 oz), SpO2 98 %.  Physical Exam   Constitutional: She is oriented to person, place, and time. She appears well-developed and well-nourished.   HENT:   Head: Normocephalic and atraumatic.   Eyes: EOM are normal. Pupils are equal, round, and reactive to light.   Cardiovascular: Normal rate and regular rhythm.    Pulmonary/Chest: Effort normal.   Abdominal: Soft. She exhibits no distension. There is no " tenderness. There is no guarding.   Thin body habitus;  Small faint lap scars   Musculoskeletal: Normal range of motion.   Neurological: She is alert and oriented to person, place, and time.   Skin: Skin is warm and dry.   Psychiatric: She has a normal mood and affect. Her behavior is normal.       ABO type: O POS    Imaging Studies:  Nuclear split function renal scan:   Left:  53%   Right: 47%  CT angiogram:   Left renal arteries: 1   Right renal arteries: 1   Other important findings: n/a    Assessment:     1. Donor of kidney for transplant        Plan:   Transplant Surgery Donor Candidacy:   Ms. Adams remains an excellent candidate for kidney donation.    Based on the preoperative evaluation, a left robotic donor nephrectomy is planned.  Bertrand Diaz Jr, MD       Counseling:   I discussed with Ms. Adams that donation is a voluntary activity and reiterated it should be done willingly and for altruistic reasons only.  I reviewed that no payment should be received for donating.  I also discussed that coercion, guilt, pressure, or feelings of obligation are not appropriate reasons to donate.  The option to withdraw at any time was emphasized.  Ms. Adams was reminded that a medical opt out can be given to protect her confidentiality, and no member of the transplant team will discuss specifics of her health or medical/social history with anyone else without permission.  The need for lifelong routine medical follow-up for optimal health, including routine health maintenance was reviewed.    I discussed the risks related to surgery including the risks related to anesthesia, bleeding, infection, inability for surgery to be performed laparoscopically, risks of reoperation as well as the risks of death.  We discussed the length of hospitalization, return to work times, as well as follow-up post-donation.    I also discussed the slight possibility that due to problems with the recipient operation, the  transplant might not be able to be completed after the organ was already removed. If such a situation should arise, the donor prefers that the organ be transplanted into a suitable third-party recipient.

## 2017-07-10 ENCOUNTER — HOSPITAL ENCOUNTER (INPATIENT)
Facility: HOSPITAL | Age: 47
LOS: 1 days | Discharge: HOME | DRG: 661 | End: 2017-07-11
Attending: TRANSPLANT SURGERY | Admitting: TRANSPLANT SURGERY
Payer: MEDICARE

## 2017-07-10 ENCOUNTER — ANESTHESIA (OUTPATIENT)
Dept: SURGERY | Facility: HOSPITAL | Age: 47
DRG: 661 | End: 2017-07-10
Payer: MEDICARE

## 2017-07-10 DIAGNOSIS — Z00.5 TRANSPLANT DONOR EVALUATION: ICD-10-CM

## 2017-07-10 DIAGNOSIS — Z52.4 DONOR OF KIDNEY FOR TRANSPLANT: Primary | ICD-10-CM

## 2017-07-10 LAB
B-HCG UR QL: NEGATIVE
CTP QC/QA: YES
HCT VFR BLD AUTO: 37.5 %

## 2017-07-10 PROCEDURE — 25000003 PHARM REV CODE 250: Performed by: TRANSPLANT SURGERY

## 2017-07-10 PROCEDURE — 50547 LAPARO REMOVAL DONOR KIDNEY: CPT | Mod: 82,,, | Performed by: TRANSPLANT SURGERY

## 2017-07-10 PROCEDURE — 37000009 HC ANESTHESIA EA ADD 15 MINS: Performed by: TRANSPLANT SURGERY

## 2017-07-10 PROCEDURE — 71000033 HC RECOVERY, INTIAL HOUR: Performed by: TRANSPLANT SURGERY

## 2017-07-10 PROCEDURE — D9220A PRA ANESTHESIA: Mod: CRNA,,, | Performed by: NURSE ANESTHETIST, CERTIFIED REGISTERED

## 2017-07-10 PROCEDURE — 99499 UNLISTED E&M SERVICE: CPT | Mod: ,,, | Performed by: TRANSPLANT SURGERY

## 2017-07-10 PROCEDURE — 63600175 PHARM REV CODE 636 W HCPCS: Performed by: NURSE ANESTHETIST, CERTIFIED REGISTERED

## 2017-07-10 PROCEDURE — D9220A PRA ANESTHESIA: Mod: ANES,,, | Performed by: ANESTHESIOLOGY

## 2017-07-10 PROCEDURE — 71000039 HC RECOVERY, EACH ADD'L HOUR: Performed by: TRANSPLANT SURGERY

## 2017-07-10 PROCEDURE — 36000712 HC OR TIME LEV V 1ST 15 MIN: Performed by: TRANSPLANT SURGERY

## 2017-07-10 PROCEDURE — 63600175 PHARM REV CODE 636 W HCPCS: Performed by: ANESTHESIOLOGY

## 2017-07-10 PROCEDURE — 27201423 OPTIME MED/SURG SUP & DEVICES STERILE SUPPLY: Performed by: TRANSPLANT SURGERY

## 2017-07-10 PROCEDURE — 81025 URINE PREGNANCY TEST: CPT | Mod: NTX | Performed by: TRANSPLANT SURGERY

## 2017-07-10 PROCEDURE — 25000003 PHARM REV CODE 250: Performed by: NURSE ANESTHETIST, CERTIFIED REGISTERED

## 2017-07-10 PROCEDURE — 25000003 PHARM REV CODE 250: Mod: NTX | Performed by: TRANSPLANT SURGERY

## 2017-07-10 PROCEDURE — 37000008 HC ANESTHESIA 1ST 15 MINUTES: Performed by: TRANSPLANT SURGERY

## 2017-07-10 PROCEDURE — 63600175 PHARM REV CODE 636 W HCPCS: Performed by: TRANSPLANT SURGERY

## 2017-07-10 PROCEDURE — 94799 UNLISTED PULMONARY SVC/PX: CPT

## 2017-07-10 PROCEDURE — 8E0W4CZ ROBOTIC ASSISTED PROCEDURE OF TRUNK REGION, PERCUTANEOUS ENDOSCOPIC APPROACH: ICD-10-PCS | Performed by: TRANSPLANT SURGERY

## 2017-07-10 PROCEDURE — 25000003 PHARM REV CODE 250: Mod: NTX | Performed by: ANESTHESIOLOGY

## 2017-07-10 PROCEDURE — 20600001 HC STEP DOWN PRIVATE ROOM

## 2017-07-10 PROCEDURE — 36415 COLL VENOUS BLD VENIPUNCTURE: CPT

## 2017-07-10 PROCEDURE — 27100025 HC TUBING, SET FLUID WARMER: Mod: NTX | Performed by: NURSE ANESTHETIST, CERTIFIED REGISTERED

## 2017-07-10 PROCEDURE — 94760 N-INVAS EAR/PLS OXIMETRY 1: CPT

## 2017-07-10 PROCEDURE — 85014 HEMATOCRIT: CPT

## 2017-07-10 PROCEDURE — 50547 LAPARO REMOVAL DONOR KIDNEY: CPT | Mod: ,,, | Performed by: TRANSPLANT SURGERY

## 2017-07-10 PROCEDURE — 36000713 HC OR TIME LEV V EA ADD 15 MIN: Performed by: TRANSPLANT SURGERY

## 2017-07-10 PROCEDURE — 0TT10ZZ RESECTION OF LEFT KIDNEY, OPEN APPROACH: ICD-10-PCS | Performed by: TRANSPLANT SURGERY

## 2017-07-10 PROCEDURE — 27000221 HC OXYGEN, UP TO 24 HOURS

## 2017-07-10 PROCEDURE — 25000003 PHARM REV CODE 250: Performed by: PHYSICIAN ASSISTANT

## 2017-07-10 RX ORDER — ACETAMINOPHEN 10 MG/ML
INJECTION, SOLUTION INTRAVENOUS
Status: DISCONTINUED | OUTPATIENT
Start: 2017-07-10 | End: 2017-07-10

## 2017-07-10 RX ORDER — HEPARIN SODIUM 5000 [USP'U]/ML
5000 INJECTION, SOLUTION INTRAVENOUS; SUBCUTANEOUS EVERY 8 HOURS
Status: DISCONTINUED | OUTPATIENT
Start: 2017-07-10 | End: 2017-07-11 | Stop reason: HOSPADM

## 2017-07-10 RX ORDER — GLYCOPYRROLATE 0.2 MG/ML
INJECTION INTRAMUSCULAR; INTRAVENOUS
Status: DISCONTINUED | OUTPATIENT
Start: 2017-07-10 | End: 2017-07-10

## 2017-07-10 RX ORDER — BUPIVACAINE HYDROCHLORIDE 2.5 MG/ML
INJECTION, SOLUTION EPIDURAL; INFILTRATION; INTRACAUDAL
Status: DISCONTINUED | OUTPATIENT
Start: 2017-07-10 | End: 2017-07-10

## 2017-07-10 RX ORDER — DEXAMETHASONE SODIUM PHOSPHATE 4 MG/ML
INJECTION, SOLUTION INTRA-ARTICULAR; INTRALESIONAL; INTRAMUSCULAR; INTRAVENOUS; SOFT TISSUE
Status: DISCONTINUED | OUTPATIENT
Start: 2017-07-10 | End: 2017-07-10

## 2017-07-10 RX ORDER — HYDROMORPHONE HYDROCHLORIDE 2 MG/ML
INJECTION, SOLUTION INTRAMUSCULAR; INTRAVENOUS; SUBCUTANEOUS
Status: DISCONTINUED | OUTPATIENT
Start: 2017-07-10 | End: 2017-07-10

## 2017-07-10 RX ORDER — SODIUM CHLORIDE 9 MG/ML
INJECTION, SOLUTION INTRAVENOUS CONTINUOUS PRN
Status: DISCONTINUED | OUTPATIENT
Start: 2017-07-10 | End: 2017-07-10

## 2017-07-10 RX ORDER — ONDANSETRON 2 MG/ML
INJECTION INTRAMUSCULAR; INTRAVENOUS
Status: DISCONTINUED | OUTPATIENT
Start: 2017-07-10 | End: 2017-07-10

## 2017-07-10 RX ORDER — ACETAMINOPHEN 10 MG/ML
1000 INJECTION, SOLUTION INTRAVENOUS ONCE
Status: DISCONTINUED | OUTPATIENT
Start: 2017-07-10 | End: 2017-07-10 | Stop reason: HOSPADM

## 2017-07-10 RX ORDER — LIDOCAINE HCL/PF 100 MG/5ML
SYRINGE (ML) INTRAVENOUS
Status: DISCONTINUED | OUTPATIENT
Start: 2017-07-10 | End: 2017-07-10

## 2017-07-10 RX ORDER — CEFAZOLIN SODIUM 2 G/50ML
2 SOLUTION INTRAVENOUS
Status: COMPLETED | OUTPATIENT
Start: 2017-07-10 | End: 2017-07-11

## 2017-07-10 RX ORDER — MIDAZOLAM HYDROCHLORIDE 1 MG/ML
INJECTION, SOLUTION INTRAMUSCULAR; INTRAVENOUS
Status: DISCONTINUED | OUTPATIENT
Start: 2017-07-10 | End: 2017-07-10

## 2017-07-10 RX ORDER — SODIUM CHLORIDE 0.9 % (FLUSH) 0.9 %
3 SYRINGE (ML) INJECTION
Status: DISCONTINUED | OUTPATIENT
Start: 2017-07-10 | End: 2017-07-10 | Stop reason: HOSPADM

## 2017-07-10 RX ORDER — PROPOFOL 10 MG/ML
VIAL (ML) INTRAVENOUS
Status: DISCONTINUED | OUTPATIENT
Start: 2017-07-10 | End: 2017-07-10

## 2017-07-10 RX ORDER — DEXTROSE MONOHYDRATE AND SODIUM CHLORIDE 5; .45 G/100ML; G/100ML
INJECTION, SOLUTION INTRAVENOUS CONTINUOUS
Status: DISCONTINUED | OUTPATIENT
Start: 2017-07-10 | End: 2017-07-11

## 2017-07-10 RX ORDER — KETOROLAC TROMETHAMINE 30 MG/ML
15 INJECTION, SOLUTION INTRAMUSCULAR; INTRAVENOUS EVERY 6 HOURS
Status: DISCONTINUED | OUTPATIENT
Start: 2017-07-10 | End: 2017-07-11 | Stop reason: HOSPADM

## 2017-07-10 RX ORDER — FUROSEMIDE 10 MG/ML
INJECTION INTRAMUSCULAR; INTRAVENOUS
Status: DISCONTINUED | OUTPATIENT
Start: 2017-07-10 | End: 2017-07-10

## 2017-07-10 RX ORDER — HYDROMORPHONE HYDROCHLORIDE 1 MG/ML
0.2 INJECTION, SOLUTION INTRAMUSCULAR; INTRAVENOUS; SUBCUTANEOUS EVERY 5 MIN PRN
Status: DISCONTINUED | OUTPATIENT
Start: 2017-07-10 | End: 2017-07-10 | Stop reason: HOSPADM

## 2017-07-10 RX ORDER — MEPERIDINE HYDROCHLORIDE 50 MG/ML
12.5 INJECTION INTRAMUSCULAR; INTRAVENOUS; SUBCUTANEOUS ONCE AS NEEDED
Status: DISCONTINUED | OUTPATIENT
Start: 2017-07-10 | End: 2017-07-10 | Stop reason: HOSPADM

## 2017-07-10 RX ORDER — OXYCODONE AND ACETAMINOPHEN 5; 325 MG/1; MG/1
2 TABLET ORAL EVERY 4 HOURS PRN
Status: DISCONTINUED | OUTPATIENT
Start: 2017-07-10 | End: 2017-07-11 | Stop reason: HOSPADM

## 2017-07-10 RX ORDER — KETOROLAC TROMETHAMINE 30 MG/ML
INJECTION, SOLUTION INTRAMUSCULAR; INTRAVENOUS
Status: DISCONTINUED | OUTPATIENT
Start: 2017-07-10 | End: 2017-07-10

## 2017-07-10 RX ORDER — ESCITALOPRAM OXALATE 10 MG/1
10 TABLET ORAL NIGHTLY
Status: DISCONTINUED | OUTPATIENT
Start: 2017-07-10 | End: 2017-07-11 | Stop reason: HOSPADM

## 2017-07-10 RX ORDER — ROCURONIUM BROMIDE 10 MG/ML
INJECTION, SOLUTION INTRAVENOUS
Status: DISCONTINUED | OUTPATIENT
Start: 2017-07-10 | End: 2017-07-10

## 2017-07-10 RX ORDER — PHENYLEPHRINE HYDROCHLORIDE 10 MG/ML
INJECTION INTRAVENOUS
Status: DISCONTINUED | OUTPATIENT
Start: 2017-07-10 | End: 2017-07-10

## 2017-07-10 RX ORDER — ESMOLOL HYDROCHLORIDE 10 MG/ML
INJECTION INTRAVENOUS
Status: DISCONTINUED | OUTPATIENT
Start: 2017-07-10 | End: 2017-07-10

## 2017-07-10 RX ORDER — NEOSTIGMINE METHYLSULFATE 1 MG/ML
INJECTION, SOLUTION INTRAVENOUS
Status: DISCONTINUED | OUTPATIENT
Start: 2017-07-10 | End: 2017-07-10

## 2017-07-10 RX ORDER — FENTANYL CITRATE 50 UG/ML
INJECTION, SOLUTION INTRAMUSCULAR; INTRAVENOUS
Status: DISCONTINUED | OUTPATIENT
Start: 2017-07-10 | End: 2017-07-10

## 2017-07-10 RX ORDER — SCOLOPAMINE TRANSDERMAL SYSTEM 1 MG/1
1 PATCH, EXTENDED RELEASE TRANSDERMAL
Status: DISCONTINUED | OUTPATIENT
Start: 2017-07-10 | End: 2017-07-10

## 2017-07-10 RX ORDER — PROMETHAZINE HYDROCHLORIDE 25 MG/ML
25 INJECTION, SOLUTION INTRAMUSCULAR; INTRAVENOUS EVERY 6 HOURS PRN
Status: DISCONTINUED | OUTPATIENT
Start: 2017-07-10 | End: 2017-07-10

## 2017-07-10 RX ORDER — OXYCODONE AND ACETAMINOPHEN 5; 325 MG/1; MG/1
1 TABLET ORAL EVERY 4 HOURS PRN
Status: DISCONTINUED | OUTPATIENT
Start: 2017-07-10 | End: 2017-07-11 | Stop reason: HOSPADM

## 2017-07-10 RX ORDER — HEPARIN SODIUM 5000 [USP'U]/ML
5000 INJECTION, SOLUTION INTRAVENOUS; SUBCUTANEOUS
Status: COMPLETED | OUTPATIENT
Start: 2017-07-10 | End: 2017-07-10

## 2017-07-10 RX ADMIN — SODIUM CHLORIDE: 0.9 INJECTION, SOLUTION INTRAVENOUS at 11:07

## 2017-07-10 RX ADMIN — HYDROMORPHONE HYDROCHLORIDE 0.2 MG: 1 INJECTION, SOLUTION INTRAMUSCULAR; INTRAVENOUS; SUBCUTANEOUS at 05:07

## 2017-07-10 RX ADMIN — DEXTROSE AND SODIUM CHLORIDE: 5; .45 INJECTION, SOLUTION INTRAVENOUS at 04:07

## 2017-07-10 RX ADMIN — ROCURONIUM BROMIDE 10 MG: 10 INJECTION, SOLUTION INTRAVENOUS at 02:07

## 2017-07-10 RX ADMIN — ESMOLOL HYDROCHLORIDE 20 MG: 10 INJECTION INTRAVENOUS at 02:07

## 2017-07-10 RX ADMIN — CALCIUM CHLORIDE 500 MG: 100 INJECTION, SOLUTION INTRAVENOUS at 01:07

## 2017-07-10 RX ADMIN — KETOROLAC TROMETHAMINE 15 MG: 30 INJECTION, SOLUTION INTRAMUSCULAR; INTRAVENOUS at 03:07

## 2017-07-10 RX ADMIN — PHENYLEPHRINE HYDROCHLORIDE 200 MCG: 10 INJECTION INTRAVENOUS at 01:07

## 2017-07-10 RX ADMIN — FENTANYL CITRATE 100 MCG: 50 INJECTION, SOLUTION INTRAMUSCULAR; INTRAVENOUS at 12:07

## 2017-07-10 RX ADMIN — ROCURONIUM BROMIDE 10 MG: 10 INJECTION, SOLUTION INTRAVENOUS at 01:07

## 2017-07-10 RX ADMIN — PROMETHAZINE HYDROCHLORIDE 6.25 MG: 25 INJECTION INTRAMUSCULAR; INTRAVENOUS at 03:07

## 2017-07-10 RX ADMIN — FUROSEMIDE 10 MG: 10 INJECTION, SOLUTION INTRAMUSCULAR; INTRAVENOUS at 02:07

## 2017-07-10 RX ADMIN — ESMOLOL HYDROCHLORIDE 10 MG: 10 INJECTION INTRAVENOUS at 02:07

## 2017-07-10 RX ADMIN — MIDAZOLAM HYDROCHLORIDE 2 MG: 1 INJECTION, SOLUTION INTRAMUSCULAR; INTRAVENOUS at 12:07

## 2017-07-10 RX ADMIN — PHENYLEPHRINE HYDROCHLORIDE 100 MCG: 10 INJECTION INTRAVENOUS at 12:07

## 2017-07-10 RX ADMIN — SODIUM CHLORIDE, SODIUM GLUCONATE, SODIUM ACETATE, POTASSIUM CHLORIDE, MAGNESIUM CHLORIDE, SODIUM PHOSPHATE, DIBASIC, AND POTASSIUM PHOSPHATE: .53; .5; .37; .037; .03; .012; .00082 INJECTION, SOLUTION INTRAVENOUS at 01:07

## 2017-07-10 RX ADMIN — SODIUM CHLORIDE, SODIUM GLUCONATE, SODIUM ACETATE, POTASSIUM CHLORIDE, MAGNESIUM CHLORIDE, SODIUM PHOSPHATE, DIBASIC, AND POTASSIUM PHOSPHATE: .53; .5; .37; .037; .03; .012; .00082 INJECTION, SOLUTION INTRAVENOUS at 03:07

## 2017-07-10 RX ADMIN — HYDROMORPHONE HYDROCHLORIDE 0.4 MG: 2 INJECTION, SOLUTION INTRAMUSCULAR; INTRAVENOUS; SUBCUTANEOUS at 03:07

## 2017-07-10 RX ADMIN — SCOPALAMINE 1.5 MG: 1 PATCH, EXTENDED RELEASE TRANSDERMAL at 11:07

## 2017-07-10 RX ADMIN — GLYCOPYRROLATE 0.4 MG: 0.2 INJECTION, SOLUTION INTRAMUSCULAR; INTRAVENOUS at 03:07

## 2017-07-10 RX ADMIN — CALCIUM CHLORIDE 500 MG: 100 INJECTION, SOLUTION INTRAVENOUS at 02:07

## 2017-07-10 RX ADMIN — ACETAMINOPHEN 1000 MG: 10 INJECTION, SOLUTION INTRAVENOUS at 01:07

## 2017-07-10 RX ADMIN — Medication 2 G: at 01:07

## 2017-07-10 RX ADMIN — KETOROLAC TROMETHAMINE 15 MG: 30 INJECTION, SOLUTION INTRAMUSCULAR at 09:07

## 2017-07-10 RX ADMIN — OXYCODONE HYDROCHLORIDE AND ACETAMINOPHEN 2 TABLET: 5; 325 TABLET ORAL at 04:07

## 2017-07-10 RX ADMIN — CEFAZOLIN SODIUM 2 G: 2 SOLUTION INTRAVENOUS at 09:07

## 2017-07-10 RX ADMIN — PHENYLEPHRINE HYDROCHLORIDE 200 MCG: 10 INJECTION INTRAVENOUS at 02:07

## 2017-07-10 RX ADMIN — NEOSTIGMINE METHYLSULFATE 3 MG: 1 INJECTION INTRAVENOUS at 03:07

## 2017-07-10 RX ADMIN — FENTANYL CITRATE 100 MCG: 50 INJECTION, SOLUTION INTRAMUSCULAR; INTRAVENOUS at 01:07

## 2017-07-10 RX ADMIN — EPHEDRINE SULFATE 10 MG: 50 INJECTION, SOLUTION INTRAMUSCULAR; INTRAVENOUS; SUBCUTANEOUS at 02:07

## 2017-07-10 RX ADMIN — SODIUM CHLORIDE, SODIUM GLUCONATE, SODIUM ACETATE, POTASSIUM CHLORIDE, MAGNESIUM CHLORIDE, SODIUM PHOSPHATE, DIBASIC, AND POTASSIUM PHOSPHATE: .53; .5; .37; .037; .03; .012; .00082 INJECTION, SOLUTION INTRAVENOUS at 02:07

## 2017-07-10 RX ADMIN — PROPOFOL 30 MG: 10 INJECTION, EMULSION INTRAVENOUS at 01:07

## 2017-07-10 RX ADMIN — DEXAMETHASONE SODIUM PHOSPHATE 8 MG: 4 INJECTION, SOLUTION INTRAMUSCULAR; INTRAVENOUS at 01:07

## 2017-07-10 RX ADMIN — LIDOCAINE HYDROCHLORIDE 100 MG: 20 INJECTION, SOLUTION INTRAVENOUS at 12:07

## 2017-07-10 RX ADMIN — ROCURONIUM BROMIDE 50 MG: 10 INJECTION, SOLUTION INTRAVENOUS at 12:07

## 2017-07-10 RX ADMIN — HEPARIN SODIUM 5000 UNITS: 5000 INJECTION, SOLUTION INTRAVENOUS; SUBCUTANEOUS at 09:07

## 2017-07-10 RX ADMIN — HEPARIN SODIUM 5000 UNITS: 5000 INJECTION, SOLUTION INTRAVENOUS; SUBCUTANEOUS at 11:07

## 2017-07-10 RX ADMIN — ONDANSETRON 4 MG: 2 INJECTION INTRAMUSCULAR; INTRAVENOUS at 03:07

## 2017-07-10 RX ADMIN — EPHEDRINE SULFATE 5 MG: 50 INJECTION, SOLUTION INTRAMUSCULAR; INTRAVENOUS; SUBCUTANEOUS at 02:07

## 2017-07-10 RX ADMIN — PROPOFOL 150 MG: 10 INJECTION, EMULSION INTRAVENOUS at 12:07

## 2017-07-10 RX ADMIN — ESCITALOPRAM OXALATE 10 MG: 10 TABLET ORAL at 09:07

## 2017-07-10 RX ADMIN — FENTANYL CITRATE 50 MCG: 50 INJECTION, SOLUTION INTRAMUSCULAR; INTRAVENOUS at 02:07

## 2017-07-10 NOTE — OP NOTE
Operative Report    Date of Procedure: 07/10/2017    Surgeons:  Surgeon(s) and Role:     * William Castellon MD - Assisting     * Hope José MD - Primary     * Vikash Segura MD - Assisting    First Assistant Attestation:  The presence of an additional attending surgeon functioning as first assistant was required due to the complexity of the procedure relative to any available residents. I certify that no resident was available who was qualified to serve as first assistant. Duties performed by the assistant included assisting the primary surgeon.    Pre-operative Diagnosis: Living Kidney Donor  Post-operative Diagnosis: Same  Procedure(s) Performed:   Left Kidney  robotic donor nephrectomy    Anesthesia: General endotracheal    Preamble  Indications and Patient Counseling: The patient is a 47 y.o. year-old female who has been evaluated as a living kidney donor.  A left  robotic nephrectomy is planned.  The patient has been thoroughly informed regarding the risks of the procedure, including death, serious surgical complications, bleeding, and reoperation.  She has also been informed of the possible need for conversion to open surgery.  She is aware that kidney donation is completely voluntary, and denies any coercion or motive for donating other than a sincere desire to benefit the recipient.  The patient voices understanding and agrees to proceed with the planned procedure.    ABO Confirmation: Prior to proceeding with donor nephrectomy, a formal ABO confirmation was done according to hospital and UNOS policies.  I confirmed the UNOS ID number of the donor organ and the donor and recipient ABO types.  This confirmation was personally done by an attending surgeon and circulating nurse, and is officially documented elsewhere.    Time-Out: A complete time out was carried out prior to incision, with confirmation of patient identity, correct procedure, correct operative site, appropriate antibiotic prophylaxis,  review of any known allergies, and presence of all needed equipment.    Procedure in Detail  Following the induction of general endotracheal anesthesia, the patient was positioned in a right lateral decubitus position with the table flexed.  The abdomen and left flank were sterilely prepped and draped.  A balloon-tipped 12 mm laparoscopic port was introduced just below the umbilicus using an open Alexia technique.  The abdomen was then insufflated to 15 mmHg pressure.  Three additional ports were then placed consisting of a 12 mm port to the left of the umbilicus, an 8 mm da Angela port just below the left costal margin near the mid axillary line and another 8 mm da Angela port just medial to the anterior superior iliac spine.  The da Angela robotic system was then docked to these ports.  Dissection was then carried out using the da Angela system.  The colon was mobilized down to the pelvic brim.  Gerota's fascia was then opened and the left renal vein was identified.  Lumbar, gonadal, and adrenal tributaries were clipped and divided as appropriate.  The left renal artery was identified as proximally as feasible.  The adrenal gland was dissected away from the superior pole of the kidney.  The ureter was swept upwards off of the psoas muscle with care taken to avoid traumatizing it.  The posterior and lateral attachments of the kidney were then taken down.  An appropriately-sized vertical incision was created just below the umbilicus for extraction of the kidney.  This was extended just large enough to admit the surgeon's hand.  The Gelport device was placed at this point.  The abdomen was re-insufflated, and the kidney was inspected with conventional laparoscopy to ensure that all non-vascular attachments hand been taken down.  Additional cautery dissection was done as needed.  The patient was given 10 mg of Lasix to ensure a brisk diuresis.  The ureter was then divided distally using a vascular Endo-TAYLOR stapler.  After  this was done, the  renal artery was divided with a vascular stapler followed by the renal vein.  The kidney was then removed through the Gelport and immediately placed on ice and flushed with ice cold UW solution. Attention was then directed to the operative field.  The operative field was thoroughly irrigated and assessed for hemostasis. The port sites were assessed for size of the fascial defect, and external suturing or a specifically designed closure product was used as appropriate.  The kidney extraction incision was closed with continuous #1 PDS.  All incisions were infiltrated with bupivicaine. Skin incisions were closed with 4-0 subcuticular Monocryl.  The patient was then taken from the Operating Room in satisfactory condition.  Prior to the conclusion of the procedure, I was told that all sponge, needle and instrument counts were correct.      Confirmation of Intended Recipient: Prior to the kidney leaving the donor operating room, the correct intended recipient was verified by the attending surgeon and the circulating nurse.      Estimated Blood Loss: 6.76 fl. oz. mL  Drains: None  Specimens: none    Findings:  Healthy-appearing kidney  Arterial anatomy: Single  Venous anatomy: Single  Ureteral anatomy: Single      Times:  Console start:  7/10/2017  1:30 PM  Console finish: 7/10/2017  2:38 PM  Artery divided:  7/10/2017  2:53 PM  Kidney on ice:  7/10/2017  2:55 PM  Flush begin:  7/10/2017  2:56 PM  Flush end:  7/10/2017  2:59 PM  Kidney out of room: 7/10/2017  3:14 PM

## 2017-07-10 NOTE — PROGRESS NOTES
Patient visited by family, updated on care. Verbalized understanding. Clear liquid diet ordered to be delivered to patient room. Patient notified.

## 2017-07-10 NOTE — INTERVAL H&P NOTE
The patient has been examined and the H&P has been reviewed:    I concur with the findings and no changes have occurred since H&P was written.    Anesthesia/Surgery risks, benefits and alternative options discussed and understood by patient/family.          Active Hospital Problems    Diagnosis  POA    Donor of kidney for transplant [Z52.4]  Not Applicable      Resolved Hospital Problems    Diagnosis Date Resolved POA   No resolved problems to display.

## 2017-07-10 NOTE — OP NOTE
Certification of Assistant at Surgery       Surgery Date: 7/10/2017     Participating Surgeons:  Surgeon(s) and Role:     * William Castellon MD - Assisting     * Hope José MD - Primary     * Vikash Segura MD - Assisting    Procedures:  Procedure(s) (LRB):  ROBOTIC ASSISTED LAPAROSCOPIC NEPHRECTOMY-DONOR (Left)    Assistant Surgeon's Certification of Necessity:  I understand that section 1842 (b) (6) (d) of the Social Security Act generally prohibits Medicare Part B reasonable charge payment for the services of assistants at surgery in teaching hospitals when qualified residents are available to furnish such services. I certify that the services for which payment is claimed were medically necessary, and that no qualified resident was available to perform the services. I further understand that these services are subject to post-payment review by the Medicare carrier.      William Castellon MD    07/10/2017  3:16 PM

## 2017-07-10 NOTE — NURSING TRANSFER
Nursing Transfer Note      7/10/2017     Transfer To: [id number]    Transfer via stretcher    Transfer with monet MOORE    Transported by RN    Medicines sent: phenergan IV    Chart send with patient: Yes    Notified: daughter

## 2017-07-10 NOTE — TRANSFER OF CARE
"Anesthesia Transfer of Care Note    Patient: Vesta Adams    Procedure(s) Performed: Procedure(s) (LRB):  ROBOTIC ASSISTED LAPAROSCOPIC NEPHRECTOMY-DONOR (Left)    Patient location: PACU    Anesthesia Type: general    Transport from OR: Transported from OR on 6-10 L/min O2 by face mask with adequate spontaneous ventilation    Post pain: adequate analgesia    Post assessment: no apparent anesthetic complications and tolerated procedure well    Post vital signs: stable    Level of consciousness: sedated and responds to stimulation    Complications: none    Transfer of care protocol was followed      Last vitals:   Visit Vitals  BP (!) 108/59   Pulse (!) 112   Temp 37.4 °C (99.3 °F) (Axillary)   Resp 16   Ht 5' 4" (1.626 m)   Wt 62.1 kg (137 lb)   LMP 06/20/2017 (Exact Date)   SpO2 100%   Breastfeeding? No   BMI 23.52 kg/m²     "

## 2017-07-11 VITALS
TEMPERATURE: 98 F | WEIGHT: 137 LBS | BODY MASS INDEX: 23.39 KG/M2 | RESPIRATION RATE: 18 BRPM | OXYGEN SATURATION: 100 % | DIASTOLIC BLOOD PRESSURE: 54 MMHG | HEIGHT: 64 IN | HEART RATE: 90 BPM | SYSTOLIC BLOOD PRESSURE: 104 MMHG

## 2017-07-11 DIAGNOSIS — Z00.5 TRANSPLANT DONOR EVALUATION: Primary | ICD-10-CM

## 2017-07-11 LAB
ALBUMIN SERPL BCP-MCNC: 2.9 G/DL
ALP SERPL-CCNC: 38 U/L
ALT SERPL W/O P-5'-P-CCNC: 9 U/L
ANION GAP SERPL CALC-SCNC: 8 MMOL/L
AST SERPL-CCNC: 20 U/L
BASOPHILS # BLD AUTO: 0 K/UL
BASOPHILS NFR BLD: 0 %
BILIRUB SERPL-MCNC: 0.5 MG/DL
BUN SERPL-MCNC: 10 MG/DL
CALCIUM SERPL-MCNC: 8 MG/DL
CHLORIDE SERPL-SCNC: 107 MMOL/L
CO2 SERPL-SCNC: 24 MMOL/L
CREAT SERPL-MCNC: 1 MG/DL
DIFFERENTIAL METHOD: ABNORMAL
EOSINOPHIL # BLD AUTO: 0 K/UL
EOSINOPHIL NFR BLD: 0 %
ERYTHROCYTE [DISTWIDTH] IN BLOOD BY AUTOMATED COUNT: 12.6 %
EST. GFR  (AFRICAN AMERICAN): >60 ML/MIN/1.73 M^2
EST. GFR  (NON AFRICAN AMERICAN): >60 ML/MIN/1.73 M^2
GLUCOSE SERPL-MCNC: 116 MG/DL
HCT VFR BLD AUTO: 32.2 %
HCT VFR BLD AUTO: 32.2 %
HCT VFR BLD AUTO: 34.6 %
HGB BLD-MCNC: 11 G/DL
LYMPHOCYTES # BLD AUTO: 0.7 K/UL
LYMPHOCYTES NFR BLD: 7.9 %
MCH RBC QN AUTO: 31.3 PG
MCHC RBC AUTO-ENTMCNC: 34.2 %
MCV RBC AUTO: 92 FL
MONOCYTES # BLD AUTO: 0.8 K/UL
MONOCYTES NFR BLD: 9.2 %
NEUTROPHILS # BLD AUTO: 7.5 K/UL
NEUTROPHILS NFR BLD: 82.7 %
PLATELET # BLD AUTO: 184 K/UL
PMV BLD AUTO: 9.5 FL
POTASSIUM SERPL-SCNC: 3.9 MMOL/L
PROT SERPL-MCNC: 5.3 G/DL
RBC # BLD AUTO: 3.52 M/UL
SODIUM SERPL-SCNC: 139 MMOL/L
WBC # BLD AUTO: 9.04 K/UL

## 2017-07-11 PROCEDURE — 85014 HEMATOCRIT: CPT

## 2017-07-11 PROCEDURE — 85025 COMPLETE CBC W/AUTO DIFF WBC: CPT

## 2017-07-11 PROCEDURE — 36415 COLL VENOUS BLD VENIPUNCTURE: CPT

## 2017-07-11 PROCEDURE — 25000003 PHARM REV CODE 250: Performed by: TRANSPLANT SURGERY

## 2017-07-11 PROCEDURE — 63600175 PHARM REV CODE 636 W HCPCS: Performed by: TRANSPLANT SURGERY

## 2017-07-11 PROCEDURE — 80053 COMPREHEN METABOLIC PANEL: CPT

## 2017-07-11 PROCEDURE — 99024 POSTOP FOLLOW-UP VISIT: CPT | Mod: ,,, | Performed by: PHYSICIAN ASSISTANT

## 2017-07-11 RX ORDER — OXYCODONE AND ACETAMINOPHEN 5; 325 MG/1; MG/1
1-2 TABLET ORAL EVERY 4 HOURS PRN
Qty: 40 TABLET | Refills: 0 | Status: SHIPPED | OUTPATIENT
Start: 2017-07-11

## 2017-07-11 RX ORDER — OXYCODONE AND ACETAMINOPHEN 5; 325 MG/1; MG/1
1-2 TABLET ORAL EVERY 4 HOURS PRN
Qty: 40 TABLET | Refills: 0 | Status: SHIPPED | OUTPATIENT
Start: 2017-07-11 | End: 2017-07-11

## 2017-07-11 RX ORDER — ONDANSETRON 8 MG/1
8 TABLET, ORALLY DISINTEGRATING ORAL EVERY 8 HOURS PRN
Qty: 30 TABLET | Refills: 1 | Status: SHIPPED | OUTPATIENT
Start: 2017-07-11

## 2017-07-11 RX ADMIN — OXYCODONE HYDROCHLORIDE AND ACETAMINOPHEN 2 TABLET: 5; 325 TABLET ORAL at 10:07

## 2017-07-11 RX ADMIN — KETOROLAC TROMETHAMINE 15 MG: 30 INJECTION, SOLUTION INTRAMUSCULAR at 09:07

## 2017-07-11 RX ADMIN — OXYCODONE HYDROCHLORIDE AND ACETAMINOPHEN 2 TABLET: 5; 325 TABLET ORAL at 07:07

## 2017-07-11 RX ADMIN — CEFAZOLIN SODIUM 2 G: 2 SOLUTION INTRAVENOUS at 02:07

## 2017-07-11 RX ADMIN — CEFAZOLIN SODIUM 2 G: 2 SOLUTION INTRAVENOUS at 05:07

## 2017-07-11 RX ADMIN — KETOROLAC TROMETHAMINE 15 MG: 30 INJECTION, SOLUTION INTRAMUSCULAR at 04:07

## 2017-07-11 RX ADMIN — HEPARIN SODIUM 5000 UNITS: 5000 INJECTION, SOLUTION INTRAVENOUS; SUBCUTANEOUS at 04:07

## 2017-07-11 RX ADMIN — OXYCODONE HYDROCHLORIDE AND ACETAMINOPHEN 2 TABLET: 5; 325 TABLET ORAL at 03:07

## 2017-07-11 NOTE — CONSULTS
Nutrition Discharge Planning: Provided pt with post surgery/nephrectomy recommendations. General healthy diet encouraged. Increased protein and fluid consumption recommended. No other needs identified.

## 2017-07-11 NOTE — PROGRESS NOTES
Admit Note/Discharge Note:     Met with patient,  and daughters: Britnye (18) and Dotty (14) to assess needs. Patient is a 47 y.o.  female, admitted for for living kidney donation.      Patient admitted from home on 7/10/2017 .  At this time, patient presents as alert and oriented x 4, pleasant, good eye contact, well groomed, recall good, concentration/judgement good, average intelligence, calm, communicative, cooperative and asking and answering questions appropriately.  At this time, patients caregiver presents as alert and oriented x 4, pleasant, good eye contact, well groomed, recall good, concentration/judgement good, average intelligence, calm, communicative, cooperative and asking and answering questions appropriately.    Household/Family Systems     Patient resides with patient's significant other and daughters, at [address] [place] [address].  Support system includes significant other: Raul Sainz and daughters: Britney and Dotty.  Patient does have dependents that are in need of being cared for. Patient's daughter Dotty are being cared for by pt's daughter Britney.     Patients primary caregiver is Raul Sainz, patients significant other, phone number [phone].  Confirmed patients contact information is [phone] (home);   Telephone Information:   Mobile [phone]   .    During admission, patient's caregiver plans to stay in patient's room.  Confirmed patient and patients caregivers do have access to reliable transportation.    Cognitive Status/Learning     Patient reports reading ability as college and states patient does have difficulty with seeing and wears glasses.  Patient reports patient learns best by a combination of verbal, written, and tactile instructions..   Needed: No.   Highest education level: Associate/Bachelor Degree    Vocation/Disability   .  Working for Income: yes  If yes, working activity level: Working Full Time    Patient is employed as  a .    Adherence     Patient reports a high level of adherence to patients health care regimen.  Adherence counseling and education provided. Patient verbalizes understanding.    Substance Use    Patient reports the following substance usage.    Tobacco: none, patient denies any use.  Alcohol: none, patient denies any use.  Illicit Drugs/Non-prescribed Medications: none, patient denies any use.  Patient states clear understanding of the potential impact of substance use.  Substance abstinence/cessation counseling, education and resources provided and reviewed.     Services Utilizing/ADLS    Infusion Service: Prior to admission, patient utilizing? no  Home Health: Prior to admission, patient utilizing? no  DME: Prior to admission, no  Pulmonary/Cardiac Rehab: Prior to admission, no  Dialysis:  Prior to admission, no  Transplant Specialty Pharmacy:  Prior to admission, no. Pt reports [facility] and [facility]    Prior to admission, patient reports patient was independent with ADLS and was driving.  Patient reports patient is not able to care for self at this time due to compromised medical condition (as documented in medical record) and physical weakness..  Patient indicates a willingness to care for self once medically cleared to do so.    Insurance/Medications    Insured by   Payor/Plan Subscr  Sex Relation Sub. Ins. ID Effective Group Num   1. BLUE CROSS BL* ALPESH WISE* 1972 Female  [id number]                                   [address] [address]   2. [place]* ALPESH WISE* 1972 Female  [id number]                                    [address]      Primary Insurance (for UNOS reporting): Public Insurance - Medicare FFS (Fee For Service)  Secondary Insurance (for UNOS reporting): Private Insurance    Patient reports patient is able to obtain and afford medications at this time and at time of discharge.    Living  Will/Healthcare Power of     Patient states patient does not have a LW and/or HCPA.   provided education regarding LW and HCPA and the completion of forms.    Coping/Mental Health    Patient is coping adequately with the aid of  family members. Pt's significant other and children present in room.  Patient indicates mental health difficulties. Pt reports a history of anxiety and takes Lexapro PRN    Discharge Planning    At time of discharge, patient plans to return to [facility] under the care of Raul Sainz.  Patients significant other will transport patient.  Per rounds today, expected discharge date has not been medically determined at this time. Patient and patients caregiver  verbalize understanding and are involved in treatment planning and discharge process.    Additional Concerns    Patient and Caregiver deny additional needs and/or concerns at this time. Patient is being followed for needs, education, resources, information, emotional support, supportive counseling, and for supportive and skilled discharge plan of care.  Patient and Caregiver verbalizes understanding and agreement with information reviewed, social work availability, and how to access available resources as needed.  remains available.    Discharge Note:    Pt will discharge to [facility] under the care of pt's significant other: Raul Davidhue with no DME/HH/infusion needs. Pt aware of, involved in, and coping well with this discharge plan. Pt did not have any concerns with the discharge plan at this time. SW remains available at [phone].

## 2017-07-11 NOTE — PROGRESS NOTES
Patient admitted for Kidney transplant donor nephrectomy.Transplant coordinator met with the patient on rounds to introduce self and explain the coordinator role.Transplant Coordinator explained that she will follow the patient while in the hospital and assist with discharge.

## 2017-07-11 NOTE — DISCHARGE SUMMARY
Discharge Summary      Admit Date: 7/10/2017    Discharge Date and Time: 07/11/2017    Attending Physician: Hope José MD     Discharge Physician: Hema Anderson MD    Principal Diagnoses: Donor of kidney for transplant    Other Secondary Diagnoses:   1. Donor of kidney for transplant    2. Transplant donor evaluation        Discharged Condition: good    Indication for Admission: Transplant donor evaluation [Z00.5]  Donor of kidney for transplant [Z52.4]  Donor of kidney for transplant [Z52.4]     Hospital Course:  Ms. Vesta Adams is a 47 y.o.F who was admitted for living unrelated donor nephrectomy. She is now s/p a left robotic nephrectomy on 7/10/17. Surgery was without complication. Patient progressed well post operatively and was subsequently discharged on POD#1. On day of discharge, patient feeling well, tolerating diet, ambulating, abdominal pain well controlled, (-)flatus/BM. Hart catheter removed, urinating without difficulty. Midline incision and lap sites clean, dry, intact. She is stable and ready for discharge. She will follow up with clinic appointment via transplant coordinator. Patient verbalized her understanding prior to discharge.       Consults: None    Significant Diagnostic Studies: labs:   Lab Results   Component Value Date    WBC 9.04 07/11/2017    HGB 11.0 (L) 07/11/2017    HCT 32.2 (L) 07/11/2017    HCT 32.2 (L) 07/11/2017    MCV 92 07/11/2017     07/11/2017     CMP  Sodium   Date Value Ref Range Status   07/11/2017 139 136 - 145 mmol/L Final     Potassium   Date Value Ref Range Status   07/11/2017 3.9 3.5 - 5.1 mmol/L Final     Chloride   Date Value Ref Range Status   07/11/2017 107 95 - 110 mmol/L Final     CO2   Date Value Ref Range Status   07/11/2017 24 23 - 29 mmol/L Final     Glucose   Date Value Ref Range Status   07/11/2017 116 (H) 70 - 110 mg/dL Final     BUN, Bld   Date Value Ref Range Status   07/11/2017 10 6 - 20 mg/dL Final     Creatinine   Date Value  Ref Range Status   07/11/2017 1.0 0.5 - 1.4 mg/dL Final     Calcium   Date Value Ref Range Status   07/11/2017 8.0 (L) 8.7 - 10.5 mg/dL Final     Total Protein   Date Value Ref Range Status   07/11/2017 5.3 (L) 6.0 - 8.4 g/dL Final     Albumin   Date Value Ref Range Status   07/11/2017 2.9 (L) 3.5 - 5.2 g/dL Final     Total Bilirubin   Date Value Ref Range Status   07/11/2017 0.5 0.1 - 1.0 mg/dL Final     Comment:     For infants and newborns, interpretation of results should be based  on gestational age, weight and in agreement with clinical  observations.  Premature Infant recommended reference ranges:  Up to 24 hours.............<8.0 mg/dL  Up to 48 hours............<12.0 mg/dL  3-5 days..................<15.0 mg/dL  6-29 days.................<15.0 mg/dL       Alkaline Phosphatase   Date Value Ref Range Status   07/11/2017 38 (L) 55 - 135 U/L Final     AST   Date Value Ref Range Status   07/11/2017 20 10 - 40 U/L Final     ALT   Date Value Ref Range Status   07/11/2017 9 (L) 10 - 44 U/L Final     Anion Gap   Date Value Ref Range Status   07/11/2017 8 8 - 16 mmol/L Final     eGFR if    Date Value Ref Range Status   07/11/2017 >60.0 >60 mL/min/1.73 m^2 Final     eGFR if non    Date Value Ref Range Status   07/11/2017 >60.0 >60 mL/min/1.73 m^2 Final     Comment:     Calculation used to obtain the estimated glomerular filtration  rate (eGFR) is the CKD-EPI equation. Since race is unknown   in our information system, the eGFR values for   -American and Non--American patients are given   for each creatinine result.           Treatments: As above.    Disposition: Home or Self Care    Patient Instructions:   Current Discharge Medication List      START taking these medications    Details   ondansetron (ZOFRAN-ODT) 8 MG TbDL Take 1 tablet (8 mg total) by mouth every 8 (eight) hours as needed (nausea).  Qty: 30 tablet, Refills: 1      oxycodone-acetaminophen (PERCOCET) 5-325 mg  per tablet Take 1-2 tablets by mouth every 4 (four) hours as needed for Pain.  Qty: 40 tablet, Refills: 0         CONTINUE these medications which have NOT CHANGED    Details   b complex vitamins tablet Take 1 tablet by mouth once daily.      escitalopram oxalate (LEXAPRO) 10 MG tablet Take 10 mg by mouth every evening.                Discharge Procedure Orders  Diet general     Lifting restrictions   Order Comments: Do not lift greater than 10 lbs for 6 weeks from time of surgery     Weight bearing restrictions (specify)   Order Comments: Do not lift greater than 10 lbs for 6 weeks from time of surgery     Call MD for:  temperature >100.4     Call MD for:  persistent nausea and vomiting or diarrhea     Call MD for:  severe uncontrolled pain     Call MD for:  redness, tenderness, or signs of infection (pain, swelling, redness, odor or green/yellow discharge around incision site)     Call MD for:  difficulty breathing or increased cough     Call MD for:  severe persistent headache     Call MD for:  worsening rash     Call MD for:  persistent dizziness, light-headedness, or visual disturbances     Call MD for:  increased confusion or weakness     Call MD for:   Order Comments: For any other concerning signs or symptoms         Time spent caring for patient (Greater than 1/2 spent in direct face-to-face contact): > 30 minutes

## 2017-07-11 NOTE — PROGRESS NOTES
Dc'd healy per standing order. Pt tolerated well. Instructed pt to notify nurse with next urination and provided hat.

## 2017-07-11 NOTE — ANESTHESIA POSTPROCEDURE EVALUATION
"Anesthesia Post Evaluation    Patient: Vesta Adams    Procedure(s) Performed: Procedure(s) (LRB):  ROBOTIC ASSISTED LAPAROSCOPIC NEPHRECTOMY-DONOR (Left)    Final Anesthesia Type: general  Patient location during evaluation: floor  Patient participation: Yes- Able to Participate  Level of consciousness: awake and alert  Post-procedure vital signs: reviewed and stable  Pain management: adequate  Airway patency: patent  PONV status at discharge: No PONV  Anesthetic complications: no      Cardiovascular status: blood pressure returned to baseline and hemodynamically stable  Respiratory status: unassisted, spontaneous ventilation and room air  Hydration status: euvolemic  Follow-up not needed.        Visit Vitals  BP (!) 108/51 (BP Location: Left arm, Patient Position: Lying, BP Method: Automatic)   Pulse 96   Temp 36.6 °C (97.9 °F) (Oral)   Resp 18   Ht 5' 4" (1.626 m)   Wt 62.1 kg (137 lb)   LMP 06/20/2017 (Exact Date)   SpO2 98%   Breastfeeding? No   BMI 23.52 kg/m²       Pain/Kristen Score: Pain Assessment Performed: Yes (7/10/2017  7:25 PM)  Presence of Pain: denies (7/10/2017  7:25 PM)  Pain Rating Prior to Med Admin: 8 (7/10/2017  5:35 PM)  Pain Rating Post Med Admin: 4 (reports pain as mild) (7/10/2017  6:05 PM)  Kristen Score: 10 (7/10/2017  6:05 PM)      "

## 2017-07-11 NOTE — PROGRESS NOTES
Discharge Education:  Pt resting in bed with family at bedside.  Reports some nausea but medication was given.  Below discharge instructions were provided to patient.  Verbalized understanding and all questions answered.    KIDNEY DONOR DISCHARGE INSTRUCTIONS    1. No heavy lifting (greater than 10-15 pounds) for six weeks.  2. Showers only, for the first two weeks after surgery.   You can take a tub bath after two weeks or when your incision is completely healed.     3. Clean the incision in the shower with a mild soap and water, rinse and dry.   4.   Call the outpatient kidney transplant coordinator Monday through Friday 8:00 a.m. - 4:30 p.m. at  [phone] or [phone], ext. [phone] if calling long distance.  After hours, on weekends and holidays call [phone] or [phone] and you will be directed to [facility] RN On Call Service for the following:    Signs and symptoms of wound infection  · Redness and/or swelling of the wound  · Drainage from the wound  · Temperature > 101.0 F   · Wound opening or separation     Signs and symptoms of urinary tract infection  · Frequency of urination or problems urinating  · Burning during urination  · Cloudy or bloody urine  · Foul smelling urine  · Temperature > 101.0  Any other medical problems in the immediate discharge period which are of concern to you.    4. You will need to see the transplant doctor approximately four weeks after discharge.  Blood and urine specimens will be obtained two hours prior to your appointment.  If you did not receive the appointments upon discharge you can schedule your appointments by calling [phone] or [phone], ext. [phone].   5. Discuss with the doctor at your clinic appointment when you can return to driving and work.  If all goes well, usually this is within 4 to 6 weeks.    6. Six months after donating your kidney, you will again need lab work and a checkup with your doctor. As well as on a yearly basis.  Our office  will need copies of these records for the first 2 years after donation.  Please have your doctor fax them to us at [phone].              *YOU SHOULD ALWAYS HAVE YEARLY MEDICAL CHECKUPS WITH YOUR LOCAL PHYSICIAN, INCLUDING BLOOD WORK TO EVALUATE YOUR KIDNEY FUNCTION.   *Avoid Advil. Motrin, Aleive and other Non-Steriodals, these can be toxic to your kidney.    *Tylenol is okay.              Dear Kidney Donor,    Thank you so much for your heroic gift.  One never really knows, if they will be called to be a hero and whether or not they will be able to. You have accomplished this in our eyes, as well as, your recipients.  This is truly an amazing gift that you have given.    At discharge, you will be given a follow up appointment for lab work and a surgical visit which will occur about 4 weeks after donation.  At this appointment you will discuss with the physician when you will be able to return to normal activities, including work, as well as any other concerns you may have.      In order to ensure your health after donation, as well the health of future donors, we will need to do some routine follow up.  This will entail a call from our transplant office at   6 months, 1 year and 2 years after donation.   We are required to send information to [employer] ([employer]) on your progress and health at these intervals.    We will inquire about the followin. Current weight  2. Diagnostic tests done since our last call (CT scan, MRI, Ultrasound)  3. Lab work results including a creatinine and urinalysis  4. Blood Pressure reading  5. Any new medical conditions such as kidney problems, diabetes or hypertension  6. Admission to the hospital, if so, for what reason     It is very important that after donation you establish with a Primary Care Physician to maintain your health.  We ask that you see your physician at 6 months after donation, and then yearly.  These visits should include a complete  physical exam, as well as blood work, including complete chemistries and urinalysis.  Please have your physician fax these lab results and clinic notes to us at [phone].    If there is anything we can help you with please call us at [phone].  We sincerely hope your donation experience was a pleasant one and wish you good health and well being.        Your Transplant Team at [facility]

## 2017-07-11 NOTE — PROGRESS NOTES
Plan for patient to discharge today.  RTC on 7/14/17 to see surgeon and coordinator with follow up lab work.  Appointment reminder slips provided to patient and caregiver.  Supplies provided to patient for urine collection.

## 2017-07-13 ENCOUNTER — NURSE TRIAGE (OUTPATIENT)
Dept: ADMINISTRATIVE | Facility: CLINIC | Age: 47
End: 2017-07-13

## 2017-07-14 ENCOUNTER — CLINICAL SUPPORT (OUTPATIENT)
Dept: TRANSPLANT | Facility: CLINIC | Age: 47
End: 2017-07-14
Payer: MEDICARE

## 2017-07-14 ENCOUNTER — LAB VISIT (OUTPATIENT)
Dept: LAB | Facility: HOSPITAL | Age: 47
End: 2017-07-14
Attending: TRANSPLANT SURGERY
Payer: MEDICARE

## 2017-07-14 ENCOUNTER — OFFICE VISIT (OUTPATIENT)
Dept: TRANSPLANT | Facility: CLINIC | Age: 47
End: 2017-07-14
Payer: MEDICARE

## 2017-07-14 VITALS
BODY MASS INDEX: 23.68 KG/M2 | TEMPERATURE: 97 F | HEART RATE: 86 BPM | OXYGEN SATURATION: 98 % | WEIGHT: 138.69 LBS | DIASTOLIC BLOOD PRESSURE: 44 MMHG | HEIGHT: 64 IN | SYSTOLIC BLOOD PRESSURE: 99 MMHG | RESPIRATION RATE: 18 BRPM

## 2017-07-14 VITALS — DIASTOLIC BLOOD PRESSURE: 72 MMHG | HEART RATE: 72 BPM | TEMPERATURE: 98 F | SYSTOLIC BLOOD PRESSURE: 109 MMHG

## 2017-07-14 DIAGNOSIS — Z00.5 TRANSPLANT DONOR EVALUATION: ICD-10-CM

## 2017-07-14 DIAGNOSIS — Z52.4 DONOR OF KIDNEY FOR TRANSPLANT: Primary | ICD-10-CM

## 2017-07-14 LAB
ALBUMIN SERPL BCP-MCNC: 3.1 G/DL
ANION GAP SERPL CALC-SCNC: 7 MMOL/L
BASOPHILS # BLD AUTO: 0.01 K/UL
BASOPHILS NFR BLD: 0.2 %
BUN SERPL-MCNC: 10 MG/DL
CALCIUM SERPL-MCNC: 8.8 MG/DL
CHLORIDE SERPL-SCNC: 102 MMOL/L
CO2 SERPL-SCNC: 28 MMOL/L
CREAT SERPL-MCNC: 1.1 MG/DL
DIFFERENTIAL METHOD: ABNORMAL
EOSINOPHIL # BLD AUTO: 0.1 K/UL
EOSINOPHIL NFR BLD: 1.6 %
ERYTHROCYTE [DISTWIDTH] IN BLOOD BY AUTOMATED COUNT: 12.2 %
EST. GFR  (AFRICAN AMERICAN): >60 ML/MIN/1.73 M^2
EST. GFR  (NON AFRICAN AMERICAN): 59.9 ML/MIN/1.73 M^2
GLUCOSE SERPL-MCNC: 95 MG/DL
HCT VFR BLD AUTO: 37.1 %
HGB BLD-MCNC: 12.3 G/DL
LYMPHOCYTES # BLD AUTO: 0.9 K/UL
LYMPHOCYTES NFR BLD: 19.5 %
MCH RBC QN AUTO: 30.7 PG
MCHC RBC AUTO-ENTMCNC: 33.2 %
MCV RBC AUTO: 93 FL
MONOCYTES # BLD AUTO: 0.3 K/UL
MONOCYTES NFR BLD: 7.7 %
NEUTROPHILS # BLD AUTO: 3.1 K/UL
NEUTROPHILS NFR BLD: 70.8 %
PHOSPHATE SERPL-MCNC: 2.9 MG/DL
PLATELET # BLD AUTO: 178 K/UL
PMV BLD AUTO: 9.3 FL
POTASSIUM SERPL-SCNC: 4 MMOL/L
RBC # BLD AUTO: 4.01 M/UL
SODIUM SERPL-SCNC: 137 MMOL/L
WBC # BLD AUTO: 4.4 K/UL

## 2017-07-14 PROCEDURE — 99999 PR PBB SHADOW E&M-EST. PATIENT-LVL II: CPT | Mod: PBBFAC,,,

## 2017-07-14 PROCEDURE — 99999 PR PBB SHADOW E&M-EST. PATIENT-LVL III: CPT | Mod: PBBFAC,,,

## 2017-07-14 PROCEDURE — 99024 POSTOP FOLLOW-UP VISIT: CPT | Mod: S$PBB,,, | Performed by: TRANSPLANT SURGERY

## 2017-07-14 PROCEDURE — 99212 OFFICE O/P EST SF 10 MIN: CPT | Mod: PBBFAC

## 2017-07-14 NOTE — PROGRESS NOTES
Patient is here for first post op visit. Patient reports pain is well controlled by pain medication.  Patient reports dizziness after taking pain medication.  Pt will switch to only taking Tylenol.  Educated on taking safety precautions when feeling dizzy to prevent falls.  BP rechecked while in clinic and had improved to 104/59.  Patient reports no bowel movement at this time.  Reeducated on bowel regimen and instructed patient to take suppository.  Patient to call if she does not have a bowel movement or having in any nausea/vomiting.  Incision without any redness or drainage.  Reviewed activity and driving instructions with patient.  Patient ok by Dr. San to discharge home tomorrow if they are no concerns after stopping pain medication.  Patient verbalized understanding. Patient to have follow up lab work in 3 weeks locally.

## 2017-07-14 NOTE — TELEPHONE ENCOUNTER
Reason for Disposition   Patient sounds very sick or weak to the triager    Prot[facility]  [facility]    Vesta , who is a kidney donor (donated 07/10/2017), is concerned that she has not had a BM since Monday, prior to the surgery. She is passing flatus.  Her caregiver, Raul, called to say she has also been nauseated since 1830, and she is getting some Zofran now. Her axillary temp (actual) is 100.2, as well. They are still in the [facility].  Bertrand Diaz, on call abdominal transplant surgeon tonight, is notified of her symptoms as noted above. Per MD, ED not indicated at this time.  He did say he would like for her to be seen in transplant clinic tomorrow. Also is provided the number to speak to Raul / Vesta, as he is aware of her increased concern and anxiety.  Message to Dr Diaz.  Message to kidney transplant team.  Please contact caller directly with any additional care advice.]    BPA 16 - notification of physician  BPA 22 - notification re: organ donor patient symptoms.  BPA - 24 - fever

## 2017-07-14 NOTE — PROGRESS NOTES
Transplant Surgery Kidney Donor Follow-up    Chief Complaint: Vesta Adams is a 47 y.o. year old female who  Underwent left robotic/laparoscopic donor nephrectomy on .  She presents for surgical follow-up.  Current symptoms include constipation- passing flatus No BM. Nausea only with pain meds.    Review of Systems   Constitutional: Negative for activity change, appetite change, chills and fever.   Respiratory: Negative for cough and shortness of breath.    Gastrointestinal: Positive for constipation. Negative for abdominal distention, diarrhea, nausea and vomiting.   Genitourinary: Negative for difficulty urinating and dyspareunia.   Musculoskeletal: Negative.    Skin: Negative for color change and rash.   Allergic/Immunologic: Negative for immunocompromised state.   Neurological: Negative for dizziness and headaches.   Psychiatric/Behavioral: Negative.      Physical Exam   Constitutional: She is oriented to person, place, and time. She appears well-developed and well-nourished. No distress.   HENT:   Mouth/Throat: No oropharyngeal exudate.   Eyes: Pupils are equal, round, and reactive to light. No scleral icterus.   Neck: Neck supple. No thyromegaly present.   Cardiovascular: Normal rate, normal heart sounds and intact distal pulses.    No murmur heard.  Pulmonary/Chest: No respiratory distress. She has no wheezes. She has no rales.   Abdominal: Soft. Bowel sounds are normal. She exhibits no distension and no mass. There is no tenderness. There is no rebound and no guarding. No hernia.   Musculoskeletal: She exhibits no edema.   Lymphadenopathy:     She has no cervical adenopathy.   Neurological: She is alert and oriented to person, place, and time.   Skin: Skin is dry. She is not diaphoretic. No pallor.   Psychiatric: She has a normal mood and affect.   Nursing note and vitals reviewed.  inc- c/d/i   Lab Results   Component Value Date    BUN 10 07/14/2017    CREATININE 1.1 07/14/2017     Lab Results    Component Value Date    WBC 4.40 07/14/2017    HGB 12.3 07/14/2017    HCT 37.1 07/14/2017     07/14/2017       Assessment and Plan:  1. Donor of kidney for transplant        Vesta is doing adequately following living kidney donation.  She  is advised to refrain from heavy lifting for a period of two to four weeks from the date of surgery and then gradually resume normal activities. She will return for an additional follow-up visit in approximately  2 weeks.    I discussed the need for our program to be able to contact living donors for [employer] reporting purposes for a minimum of 2 years.  Failure of our center to be able to provide such information could jeopardize our ability to continue to offer living donor transplants.  Vesta voices understanding and agrees to this follow-up.

## 2017-07-26 ENCOUNTER — TELEPHONE (OUTPATIENT)
Dept: TRANSPLANT | Facility: CLINIC | Age: 47
End: 2017-07-26

## 2017-07-26 NOTE — TELEPHONE ENCOUNTER
Called to touch base with patient for 4 week follow up. Pt doing well without any complications.  Reports incision is healing nicely.  Lab orders emailed to patient and will have collected on 8/9/17.  All questions answered and patient verbalized understanding.

## 2017-08-11 ENCOUNTER — TELEPHONE (OUTPATIENT)
Dept: TRANSPLANT | Facility: CLINIC | Age: 47
End: 2017-08-11

## 2017-08-11 NOTE — TELEPHONE ENCOUNTER
Received 4 week labs and reviewed with patient.  Pt denies any urinary symptoms and will repeat urine with PCP if symptoms develop.  Voiding without difficulty and denies any complications.

## 2017-11-02 NOTE — PROGRESS NOTES
DONOR NEPHRECTOMY NOTE:    Vesta Adams is s/p donor nephrectomy on 7/10/17.  This patients medications prior to surgery were reviewed and restarted, as appropriate.      EDUCATION & DISCHARGE:  Discharge medications are to include pain control as Percocet and Colace/Dulcolax while taking pain medications to prevent constipation.  Patient was counseled at discharged regarding the side effects of pain medication, including drowsiness, constipation, nausea and counseled not to operate a car while taking pain medication or take Tylenol (acetaminophen) containing medications while taking pain medication.  Patient verbalized understanding.

## 2018-02-01 ENCOUNTER — TELEPHONE (OUTPATIENT)
Dept: TRANSPLANT | Facility: CLINIC | Age: 48
End: 2018-02-01

## 2018-02-01 NOTE — TELEPHONE ENCOUNTER
Vesta HAMMONDAlia Adams    2/1[birth year]2018    [id number]    Description: female 1970    Attempt at contact: 1st    Follow-up Period:6 month    Physical Capacity: No limitations    Working for income: Yes. Working full-time    Loss of medical insurance due to donation: No    Current weight: 142 lbs.     Date of weight measurement: 2/1/18    Any ER visits since last contact: No    Any hospitalizations since last contact: No    Any medical issues since last contact: No    Dialysis required: No    Diabetes: No    Any kidney complications: No    Notes: Patient doing well without any complications.  Will e-mail lab orders to patient to have completed.  Discussed 1 year follow up. All questions answered and patient verbalized understanding.

## 2018-03-05 ENCOUNTER — TELEPHONE (OUTPATIENT)
Dept: TRANSPLANT | Facility: CLINIC | Age: 48
End: 2018-03-05

## 2018-03-05 NOTE — TELEPHONE ENCOUNTER
Called patient to follow up on blood work.  States she injured her foot and have been a boot.  Boot was removed yesterday.  Patient states she will have labs done this week and will fax results over.  All questions answered and patient verbalized understanding.

## 2018-07-16 ENCOUNTER — TELEPHONE (OUTPATIENT)
Dept: TRANSPLANT | Facility: CLINIC | Age: 48
End: 2018-07-16

## 2018-07-27 ENCOUNTER — TELEPHONE (OUTPATIENT)
Dept: TRANSPLANT | Facility: CLINIC | Age: 48
End: 2018-07-27

## 2018-07-27 NOTE — TELEPHONE ENCOUNTER
Vesta Mooreerson    7/27/2018    [id number]    Description: female 1970    Attempt at contact: 2nd    Follow-up Period:1 year    Physical Capacity: No limitations    Working for income: Yes. Working full-time    Loss of medical insurance due to donation: No    Current weight: 148 lbs.     Date of weight measurement: 7/27/18    Any ER visits since last contact: No    Any hospitalizations since last contact: No    Any medical issues since last contact: No    Dialysis required: No    Diabetes: No    Any kidney complications: No    Notes: Patient reports doing well.  New diagnosis of globis sensation.  Lab orders emailed to patient.

## 2019-07-10 ENCOUNTER — TELEPHONE (OUTPATIENT)
Dept: TRANSPLANT | Facility: CLINIC | Age: 49
End: 2019-07-10

## 2019-07-17 ENCOUNTER — TELEPHONE (OUTPATIENT)
Dept: TRANSPLANT | Facility: CLINIC | Age: 49
End: 2019-07-17

## 2019-07-17 NOTE — TELEPHONE ENCOUNTER
Vesta Adams    7/17/2019    [id number]    Description: female 1970    Attempt at contact: 1st    Follow-up Period:2 year    Physical Capacity: No limitations    Working for income: Yes. Working full-time    Loss of medical insurance due to donation: No    Current weight: 143 lbs.     Date of weight measurement: 7/17/19    Any ER visits since last contact: No    Any hospitalizations since last contact: No    Any medical issues since last contact: No    Dialysis required: No    Diabetes: No    Any kidney complications: No    Notes: Patient states she is doing great, feels she is in better health than before donation. Patient had a gyn appointment this morning and she is 143# and her BP was 120/82.   Lab orders emailed to patient to obtain locally.

## 2019-07-24 LAB
APPEARANCE UR: CLEAR
BILIRUB UR QL STRIP: NEGATIVE
BUN SERPL-MCNC: 8 MG/DL (ref 6–24)
BUN/CREAT SERPL: 8 (ref 9–23)
CALCIUM SERPL-MCNC: 9.3 MG/DL (ref 8.7–10.2)
CHLORIDE SERPL-SCNC: 98 MMOL/L (ref 96–106)
CO2 SERPL-SCNC: 23 MMOL/L (ref 20–29)
COLOR UR: YELLOW
CREAT SERPL-MCNC: 0.95 MG/DL (ref 0.57–1)
GLUCOSE SERPL-MCNC: 85 MG/DL (ref 65–99)
GLUCOSE UR QL: NEGATIVE
HGB UR QL STRIP: NEGATIVE
KETONES UR QL STRIP: NEGATIVE
LEUKOCYTE ESTERASE UR QL STRIP: NEGATIVE
MICRO URNS: ABNORMAL
NITRITE UR QL STRIP: NEGATIVE
PH UR STRIP: 7 [PH] (ref 5–7.5)
POTASSIUM SERPL-SCNC: 4.2 MMOL/L (ref 3.5–5.2)
PROT UR QL STRIP: NEGATIVE
SODIUM SERPL-SCNC: 135 MMOL/L (ref 134–144)
SP GR UR: <=1.005 (ref 1–1.03)
SPECIMEN STATUS REPORT: NORMAL
UROBILINOGEN UR STRIP-MCNC: 0.2 MG/DL (ref 0.2–1)

## 2019-07-26 ENCOUNTER — TELEPHONE (OUTPATIENT)
Dept: TRANSPLANT | Facility: CLINIC | Age: 49
End: 2019-07-26

## 2019-07-26 NOTE — TELEPHONE ENCOUNTER
Patient notified of lab results. Instructed to continue follow up with her pcp yearly. Encouraged to contact me with any questions or concerns. All questions were answered and patient verbalized understanding.

## (undated) DEVICE — SUT 3-0 12-18IN SILK

## (undated) DEVICE — SUT 2/0 30IN SILK BLK BRAI

## (undated) DEVICE — SOL NS 1000CC

## (undated) DEVICE — SUT 4-0 12-18IN SILK BLACK

## (undated) DEVICE — DRAPE SCOPE PILLOW WARMER

## (undated) DEVICE — IRRIGATOR ENDOSCOPY DISP.

## (undated) DEVICE — TRAY CATH UM FOLEY SIL W 16FR

## (undated) DEVICE — TRAY MINOR GEN SURG

## (undated) DEVICE — SUT MCRYL PLUS 4-0 PS2 27IN

## (undated) DEVICE — DRAPE SLUSH WARMER WITH DISC

## (undated) DEVICE — STAPLER SKIN ROTATING HEAD

## (undated) DEVICE — COVER LIGHT HANDLE

## (undated) DEVICE — CORD BIPOLAR 12 FOOT

## (undated) DEVICE — SEE MEDLINE ITEM 152622

## (undated) DEVICE — CLIP SPRING 6MM

## (undated) DEVICE — SEE MEDLINE ITEM 146417

## (undated) DEVICE — DRAPE BAG ISOLATION 20 X 20

## (undated) DEVICE — TROCAR ENDOPATH XCEL 5X100MM

## (undated) DEVICE — SUT 2-0 12-18IN SILK

## (undated) DEVICE — ELECTRODE REM PLYHSV RETURN 9

## (undated) DEVICE — APPLIER CLIP ENDO MED/LG 10MM

## (undated) DEVICE — DRAPE INCISE IOBAN 2 23X17IN

## (undated) DEVICE — TROCAR SPACEMAKER BLUNT 12MM

## (undated) DEVICE — HOLDER TUBE

## (undated) DEVICE — NDL 18GA X1 1/2 REG BEVEL

## (undated) DEVICE — KIT ANTIFOG

## (undated) DEVICE — SET IRR URLGY 2LINE UNIV SPIKE

## (undated) DEVICE — SYR 30CC LUER LOCK

## (undated) DEVICE — SET DECANTER MEDICHOICE

## (undated) DEVICE — TROCAR ENDOPATH XCEL 12X100MM

## (undated) DEVICE — ADHESIVE DERMABOND ADVANCED

## (undated) DEVICE — SUT 1 36IN PDS II VIO MONO

## (undated) DEVICE — APPLICATOR CHLORAPREP ORN 26ML

## (undated) DEVICE — SPONGE LAP 18X18 PREWASHED

## (undated) DEVICE — PACK SET UP CONVERTORS

## (undated) DEVICE — SUT EASE CROSSBOW CLSR SYS

## (undated) DEVICE — SUT PROLENE 6-0 BV-1 30IN

## (undated) DEVICE — STAPLER INT LINEAR ARTC 3.5-45

## (undated) DEVICE — KIT GELPORT LAPAROSCOPIC ABD

## (undated) DEVICE — COVER LIGHT HANDLE 80/CA

## (undated) DEVICE — KIT ROBOTIC 3 ARM DA VINCI SI

## (undated) DEVICE — SYR 10CC LUER LOCK

## (undated) DEVICE — GOWN SURGICAL X-LARGE

## (undated) DEVICE — DRAPE ABDOMINAL TIBURON 14X11

## (undated) DEVICE — CART STAPLE RELD 45MM WHT